# Patient Record
Sex: FEMALE | Race: WHITE | HISPANIC OR LATINO | URBAN - METROPOLITAN AREA
[De-identification: names, ages, dates, MRNs, and addresses within clinical notes are randomized per-mention and may not be internally consistent; named-entity substitution may affect disease eponyms.]

---

## 2021-03-01 ENCOUNTER — OUTPATIENT (OUTPATIENT)
Dept: OUTPATIENT SERVICES | Facility: HOSPITAL | Age: 86
LOS: 1 days | End: 2021-03-01
Payer: MEDICAID

## 2021-03-01 PROCEDURE — G9005: CPT

## 2021-03-02 ENCOUNTER — INPATIENT (INPATIENT)
Facility: HOSPITAL | Age: 86
LOS: 3 days | Discharge: HOME | DRG: 552 | End: 2021-03-06
Attending: NEUROLOGICAL SURGERY | Admitting: NEUROLOGICAL SURGERY
Payer: MEDICAID

## 2021-03-02 VITALS
TEMPERATURE: 100 F | HEART RATE: 102 BPM | DIASTOLIC BLOOD PRESSURE: 73 MMHG | OXYGEN SATURATION: 100 % | WEIGHT: 115.08 LBS | RESPIRATION RATE: 16 BRPM | SYSTOLIC BLOOD PRESSURE: 167 MMHG | HEIGHT: 58 IN

## 2021-03-02 LAB
ALBUMIN SERPL ELPH-MCNC: 3.5 G/DL — SIGNIFICANT CHANGE UP (ref 3.3–5)
ALP SERPL-CCNC: 227 U/L — HIGH (ref 40–120)
ALT FLD-CCNC: 20 U/L — SIGNIFICANT CHANGE UP (ref 10–45)
ANION GAP SERPL CALC-SCNC: 12 MMOL/L — SIGNIFICANT CHANGE UP (ref 5–17)
AST SERPL-CCNC: 44 U/L — HIGH (ref 10–40)
BASOPHILS # BLD AUTO: 0.05 K/UL — SIGNIFICANT CHANGE UP (ref 0–0.2)
BASOPHILS NFR BLD AUTO: 0.5 % — SIGNIFICANT CHANGE UP (ref 0–2)
BILIRUB SERPL-MCNC: 1.4 MG/DL — HIGH (ref 0.2–1.2)
BUN SERPL-MCNC: 16 MG/DL — SIGNIFICANT CHANGE UP (ref 7–23)
CALCIUM SERPL-MCNC: 9.1 MG/DL — SIGNIFICANT CHANGE UP (ref 8.4–10.5)
CHLORIDE SERPL-SCNC: 105 MMOL/L — SIGNIFICANT CHANGE UP (ref 96–108)
CO2 SERPL-SCNC: 19 MMOL/L — LOW (ref 22–31)
CREAT SERPL-MCNC: 0.66 MG/DL — SIGNIFICANT CHANGE UP (ref 0.5–1.3)
EOSINOPHIL # BLD AUTO: 0.06 K/UL — SIGNIFICANT CHANGE UP (ref 0–0.5)
EOSINOPHIL NFR BLD AUTO: 0.6 % — SIGNIFICANT CHANGE UP (ref 0–6)
GAS PNL BLDV: SIGNIFICANT CHANGE UP
GLUCOSE SERPL-MCNC: 168 MG/DL — HIGH (ref 70–99)
HCT VFR BLD CALC: 40.2 % — SIGNIFICANT CHANGE UP (ref 34.5–45)
HGB BLD-MCNC: 13.5 G/DL — SIGNIFICANT CHANGE UP (ref 11.5–15.5)
IMM GRANULOCYTES NFR BLD AUTO: 0.2 % — SIGNIFICANT CHANGE UP (ref 0–1.5)
LYMPHOCYTES # BLD AUTO: 1.95 K/UL — SIGNIFICANT CHANGE UP (ref 1–3.3)
LYMPHOCYTES # BLD AUTO: 20.7 % — SIGNIFICANT CHANGE UP (ref 13–44)
MCHC RBC-ENTMCNC: 30.1 PG — SIGNIFICANT CHANGE UP (ref 27–34)
MCHC RBC-ENTMCNC: 33.6 GM/DL — SIGNIFICANT CHANGE UP (ref 32–36)
MCV RBC AUTO: 89.5 FL — SIGNIFICANT CHANGE UP (ref 80–100)
MONOCYTES # BLD AUTO: 0.97 K/UL — HIGH (ref 0–0.9)
MONOCYTES NFR BLD AUTO: 10.3 % — SIGNIFICANT CHANGE UP (ref 2–14)
NEUTROPHILS # BLD AUTO: 6.35 K/UL — SIGNIFICANT CHANGE UP (ref 1.8–7.4)
NEUTROPHILS NFR BLD AUTO: 67.7 % — SIGNIFICANT CHANGE UP (ref 43–77)
NRBC # BLD: 0 /100 WBCS — SIGNIFICANT CHANGE UP (ref 0–0)
PLATELET # BLD AUTO: 209 K/UL — SIGNIFICANT CHANGE UP (ref 150–400)
POTASSIUM SERPL-MCNC: 4.4 MMOL/L — SIGNIFICANT CHANGE UP (ref 3.5–5.3)
POTASSIUM SERPL-SCNC: 4.4 MMOL/L — SIGNIFICANT CHANGE UP (ref 3.5–5.3)
PROT SERPL-MCNC: 6.7 G/DL — SIGNIFICANT CHANGE UP (ref 6–8.3)
RBC # BLD: 4.49 M/UL — SIGNIFICANT CHANGE UP (ref 3.8–5.2)
RBC # FLD: 15.4 % — HIGH (ref 10.3–14.5)
SODIUM SERPL-SCNC: 136 MMOL/L — SIGNIFICANT CHANGE UP (ref 135–145)
WBC # BLD: 9.4 K/UL — SIGNIFICANT CHANGE UP (ref 3.8–10.5)
WBC # FLD AUTO: 9.4 K/UL — SIGNIFICANT CHANGE UP (ref 3.8–10.5)

## 2021-03-02 PROCEDURE — 71045 X-RAY EXAM CHEST 1 VIEW: CPT | Mod: 26

## 2021-03-02 PROCEDURE — 99285 EMERGENCY DEPT VISIT HI MDM: CPT

## 2021-03-02 PROCEDURE — G1004: CPT

## 2021-03-02 PROCEDURE — 93010 ELECTROCARDIOGRAM REPORT: CPT

## 2021-03-02 PROCEDURE — 73030 X-RAY EXAM OF SHOULDER: CPT | Mod: 26,RT

## 2021-03-02 PROCEDURE — 73562 X-RAY EXAM OF KNEE 3: CPT | Mod: 26,LT

## 2021-03-02 PROCEDURE — 70450 CT HEAD/BRAIN W/O DYE: CPT | Mod: 26,MA

## 2021-03-02 PROCEDURE — 72125 CT NECK SPINE W/O DYE: CPT | Mod: 26,MG

## 2021-03-02 RX ORDER — ACETAMINOPHEN 500 MG
650 TABLET ORAL ONCE
Refills: 0 | Status: COMPLETED | OUTPATIENT
Start: 2021-03-02 | End: 2021-03-02

## 2021-03-02 RX ADMIN — Medication 650 MILLIGRAM(S): at 23:42

## 2021-03-02 NOTE — ED ADULT NURSE NOTE - CADM POA CENTRAL LINE
[facility]    PATIENT'S NAME: VALERIANO OLIVIER   ATTENDING PHYSICIAN: Romy Burks M.D.   Anjelica Mustache: Scott Jeans, M.D.    PATIENT ACCOUNT#:   [de-identified]    LOCATION:  [address]  MEDICAL RECORD #:   [id number]       DATE OF BIRTH: assessments:     1. Abnormal EKG. There are subtle EKG changes seen. These may represent J-point elevation. Interpretation by the computer system is perhaps acute ischemia, but the patient is asymptomatic with a negative biomarker assay.   At this poin No

## 2021-03-02 NOTE — ED PROVIDER NOTE - ATTENDING CONTRIBUTION TO CARE
89 F Armenian speaking hx provided by granddaughter, hx of RA and HTN, HLD who states pt had an unwitnessed fall, from standing pt hit her head, not on blood thinners, Pt is in the ER due to head pain and L knee pain, she reports that she has been ambulating since the fall, she told grandaueliazarter that she landed on her side, and hit her front of her body. Pt was assisted to her feet by her gradundater.. pt w/ chronic R shoulder pain, is originally from [facility]. Was told that she has a goiter in the neck that was evaluated in the past, She has no fevers, no chills, no nausea no vomiting, She has no lightheadedness no dizziness, she is at her baseline mental status she is in good spirits, she had a fall earlier this year and injured her R shoulder, she has chronic rotator cuff injury to the R shoulder, she reports that she has no new pain to the shoulder, she has 2+ radial pulse, she has intact flexion and extension of the wrist and hand bilaterally, she has no posterior scapula tenderness bilaterally, no spine of the scapula tenderness, no t/l spine tenderness. she is w/ 5/5 lower extremity strength, she has no facial asymmetry. abdomen soft, nt nd, intact flexion and extension of the bilateral knees swelling around the L knee. lungs clear. Will have labs, ct and reassessment. will send urinalysis to evaluate for infection and covid swab given pts fever,

## 2021-03-02 NOTE — ED ADULT NURSE NOTE - OBJECTIVE STATEMENT
89y F arrived to the ED s/p fall. Pt Slovenian speaking, granddaughter reports pt fell around 3pm unwitnessed and called out for help upon fall where she was found by family member. Pt reports she tripped and fell. Pt now endorsing L knee pain, R forehead pain, dizziness. Bruising noted near R eye. L knee swelling noted. Pt A&Ox3 at present. Pt denies chest pain, SOB, HA, N/V/D, abdominal pain, fever/chills, urinary symptoms, hematuria. Pt placed in position of comfort. Pt educated on call bell system and provided call bell. Bed in lowest position, wheels locked, appropriate side rails raised. Pt denies needs at this time.

## 2021-03-02 NOTE — ED PROVIDER NOTE - OBJECTIVE STATEMENT
89y Maori speaking F pmhx HTN, HLD, RA, presents to ED, accompanied by granddaughter providing translation, c/o head contusion s/p unwitnessed mechanical fall at home today at 3PM. Denies LOC, AC use, n/v. Pt lives with family and ambulates independently at home. Pt reports she lost her footing while reaching for a door, falling forward on her knees and hitting the right side of her forehead on the ground. Pt was able to call for granddaughter who assisted pt to feet. Denies preceding symptoms prior to fall. Pt now c/o head bruise, L knee pain, R shoulder pain (chronic as per granddaughter). As per granddaughter at baseline mentation. Of note pt 100F in triage. Pt previously COVID+, resolved.

## 2021-03-02 NOTE — ED PROVIDER NOTE - PHYSICAL EXAMINATION
GEN: Pt elderly, non-toxic in NAD, A&Ox3.  PSYCH: Affect and mood appropriate.  EYES: Sclera white w/o injection, EOMI, PERRLA.   ENT: Neck supple FROM. Airway patent.  RESP: No chest wall tenderness, CTA b/l, no wheezes, rales, or rhonchi.   CARDIAC: RRR, clear distinct S1, S2.  ABD: Abdomen soft, non-tender. No CVAT b/l.  MSK: +L knee contusion, FROM. Baseline ROM of b/l UE. No obvious spinal deformity, no midline spinal ttp, no step-offs.  NEURO: No focal motor or sensory deficits.  VASC: Radial and dorsalis pedis pulses 2+ b/l. No edema or calf tenderness.  SKIN: No rashes or lesions. GEN: Pt elderly, non-toxic in NAD, A&Ox3.  PSYCH: Affect and mood appropriate.  EYES: Sclera white w/o injection, EOMI, PERRLA.   ENT: Neck supple FROM. Airway patent. No nasal tenderness.  RESP: No chest wall tenderness, CTA b/l, no wheezes, rales, or rhonchi.   CARDIAC: RRR, clear distinct S1, S2.  ABD: Abdomen soft, non-tender. No CVAT b/l.  MSK: +L knee contusion, FROM. Baseline ROM of b/l UE. No obvious spinal deformity, no midline spinal ttp, no step-offs. No pelvic instability.  NEURO: No focal motor or sensory deficits.  VASC: Radial and dorsalis pedis pulses 2+ b/l. No edema or calf tenderness.  SKIN: +R forehead contusion to R brow extending to nasal bridge. No rashes or lesions.

## 2021-03-02 NOTE — ED ADULT NURSE NOTE - NSIMPLEMENTINTERV_GEN_ALL_ED
Implemented All Fall with Harm Risk Interventions:  [place] to call system. Call bell, personal items and telephone within reach. Instruct patient to call for assistance. Room bathroom lighting operational. Non-slip footwear when patient is off stretcher. Physically safe environment: no spills, clutter or unnecessary equipment. Stretcher in lowest position, wheels locked, appropriate side rails in place. Provide visual cue, wrist band, yellow gown, etc. Monitor gait and stability. Monitor for mental status changes and reorient to person, place, and time. Review medications for side effects contributing to fall risk. Reinforce activity limits and safety measures with patient and family. Provide visual clues: red socks.

## 2021-03-02 NOTE — ED ADULT TRIAGE NOTE - CHIEF COMPLAINT QUOTE
Pt fell today and has swelling to right side of forehead, Pt is 100F orally in triage. Pt not on blood thinners.

## 2021-03-02 NOTE — ED PROVIDER NOTE - PROGRESS NOTE DETAILS
spoke w/ radiology pt w/ dens fracture, pt has a c collar, discussion w/ granddaughter about grandmothers goals of care, states that likely her grandmother would likely not want sx and would not want to be hospitalized if possible she states that her grandmother doesn't even want to be wearing a c collar, she has a shoulder sling she is supposed to wear at home but doesn't likely walking at home around with it. seen by nsg recommending MRI, given pts baseline ambulatory status w/ family member at her side, unable to go to cdu will admit to nsg home meds per granddaughter- omeprazole 40, besylate 5

## 2021-03-03 DIAGNOSIS — S12.100A UNSPECIFIED DISPLACED FRACTURE OF SECOND CERVICAL VERTEBRA, INITIAL ENCOUNTER FOR CLOSED FRACTURE: ICD-10-CM

## 2021-03-03 DIAGNOSIS — Z29.9 ENCOUNTER FOR PROPHYLACTIC MEASURES, UNSPECIFIED: ICD-10-CM

## 2021-03-03 DIAGNOSIS — R74.01 ELEVATION OF LEVELS OF LIVER TRANSAMINASE LEVELS: ICD-10-CM

## 2021-03-03 DIAGNOSIS — I10 ESSENTIAL (PRIMARY) HYPERTENSION: ICD-10-CM

## 2021-03-03 DIAGNOSIS — M06.9 RHEUMATOID ARTHRITIS, UNSPECIFIED: ICD-10-CM

## 2021-03-03 DIAGNOSIS — S42.109A FRACTURE OF UNSPECIFIED PART OF SCAPULA, UNSPECIFIED SHOULDER, INITIAL ENCOUNTER FOR CLOSED FRACTURE: ICD-10-CM

## 2021-03-03 LAB
APPEARANCE UR: CLEAR — SIGNIFICANT CHANGE UP
BACTERIA # UR AUTO: NEGATIVE — SIGNIFICANT CHANGE UP
BILIRUB UR-MCNC: NEGATIVE — SIGNIFICANT CHANGE UP
COLOR SPEC: YELLOW — SIGNIFICANT CHANGE UP
CULTURE RESULTS: SIGNIFICANT CHANGE UP
DIFF PNL FLD: NEGATIVE — SIGNIFICANT CHANGE UP
EPI CELLS # UR: 2 /HPF — SIGNIFICANT CHANGE UP
GLUCOSE UR QL: NEGATIVE — SIGNIFICANT CHANGE UP
HYALINE CASTS # UR AUTO: 7 /LPF — HIGH (ref 0–2)
KETONES UR-MCNC: NEGATIVE — SIGNIFICANT CHANGE UP
LEUKOCYTE ESTERASE UR-ACNC: ABNORMAL
NITRITE UR-MCNC: NEGATIVE — SIGNIFICANT CHANGE UP
PH UR: 5.5 — SIGNIFICANT CHANGE UP (ref 5–8)
PROT UR-MCNC: NEGATIVE — SIGNIFICANT CHANGE UP
RBC CASTS # UR COMP ASSIST: 3 /HPF — SIGNIFICANT CHANGE UP (ref 0–4)
SARS-COV-2 IGG SERPL QL IA: POSITIVE
SARS-COV-2 IGM SERPL IA-ACNC: 97.7 INDEX — HIGH
SARS-COV-2 RNA SPEC QL NAA+PROBE: SIGNIFICANT CHANGE UP
SP GR SPEC: 1.02 — SIGNIFICANT CHANGE UP (ref 1.01–1.02)
SPECIMEN SOURCE: SIGNIFICANT CHANGE UP
UROBILINOGEN FLD QL: NEGATIVE — SIGNIFICANT CHANGE UP
WBC UR QL: 4 /HPF — SIGNIFICANT CHANGE UP (ref 0–5)

## 2021-03-03 PROCEDURE — 72141 MRI NECK SPINE W/O DYE: CPT | Mod: 26

## 2021-03-03 PROCEDURE — 73200 CT UPPER EXTREMITY W/O DYE: CPT | Mod: 26,RT

## 2021-03-03 PROCEDURE — 99233 SBSQ HOSP IP/OBS HIGH 50: CPT

## 2021-03-03 PROCEDURE — 76377 3D RENDER W/INTRP POSTPROCES: CPT | Mod: 26

## 2021-03-03 PROCEDURE — 93970 EXTREMITY STUDY: CPT | Mod: 26

## 2021-03-03 PROCEDURE — 99223 1ST HOSP IP/OBS HIGH 75: CPT

## 2021-03-03 PROCEDURE — 99221 1ST HOSP IP/OBS SF/LOW 40: CPT

## 2021-03-03 RX ORDER — AMLODIPINE BESYLATE 2.5 MG/1
1 TABLET ORAL
Qty: 0 | Refills: 0 | DISCHARGE

## 2021-03-03 RX ORDER — POLYETHYLENE GLYCOL 3350 17 G/17G
17 POWDER, FOR SOLUTION ORAL
Refills: 0 | Status: DISCONTINUED | OUTPATIENT
Start: 2021-03-03 | End: 2021-03-06

## 2021-03-03 RX ORDER — ACETAMINOPHEN 500 MG
650 TABLET ORAL EVERY 6 HOURS
Refills: 0 | Status: DISCONTINUED | OUTPATIENT
Start: 2021-03-03 | End: 2021-03-06

## 2021-03-03 RX ORDER — SENNA PLUS 8.6 MG/1
2 TABLET ORAL AT BEDTIME
Refills: 0 | Status: DISCONTINUED | OUTPATIENT
Start: 2021-03-03 | End: 2021-03-06

## 2021-03-03 RX ORDER — PANTOPRAZOLE SODIUM 20 MG/1
40 TABLET, DELAYED RELEASE ORAL
Refills: 0 | Status: DISCONTINUED | OUTPATIENT
Start: 2021-03-03 | End: 2021-03-06

## 2021-03-03 RX ORDER — OMEPRAZOLE 10 MG/1
1 CAPSULE, DELAYED RELEASE ORAL
Qty: 0 | Refills: 0 | DISCHARGE

## 2021-03-03 RX ORDER — ENOXAPARIN SODIUM 100 MG/ML
40 INJECTION SUBCUTANEOUS
Refills: 0 | Status: DISCONTINUED | OUTPATIENT
Start: 2021-03-03 | End: 2021-03-06

## 2021-03-03 RX ORDER — AMLODIPINE BESYLATE 2.5 MG/1
5 TABLET ORAL DAILY
Refills: 0 | Status: DISCONTINUED | OUTPATIENT
Start: 2021-03-03 | End: 2021-03-06

## 2021-03-03 RX ADMIN — ENOXAPARIN SODIUM 40 MILLIGRAM(S): 100 INJECTION SUBCUTANEOUS at 18:30

## 2021-03-03 RX ADMIN — POLYETHYLENE GLYCOL 3350 17 GRAM(S): 17 POWDER, FOR SOLUTION ORAL at 18:30

## 2021-03-03 RX ADMIN — AMLODIPINE BESYLATE 5 MILLIGRAM(S): 2.5 TABLET ORAL at 05:39

## 2021-03-03 RX ADMIN — PANTOPRAZOLE SODIUM 40 MILLIGRAM(S): 20 TABLET, DELAYED RELEASE ORAL at 05:39

## 2021-03-03 NOTE — PHYSICAL THERAPY INITIAL EVALUATION ADULT - ADDITIONAL COMMENTS
IMPRESSION 3/2/21: Head CT: Small right frontal scalp hematoma. No acute intracranial hemorrhage, vasogenic edema, extra-axial calvarial fracture. Cervical spine CT: Type II dens fracture. Cervical spondylosis as above.  X-Ray R Shoulder 3/2/21 IMPRESSION: Cortical step-off along the inferior medial border of the scapular body consistent with scapular fracture.  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  B LE Vein Scan 3/3/21 IMPRESSION: No evidence of deep venous thrombosis in either lower extremity.

## 2021-03-03 NOTE — CONSULT NOTE ADULT - PROBLEM SELECTOR RECOMMENDATION 3
mildly elevated bilirubin, alk phos and AST  would check repeat CMP in AM  no GI symptoms at this time

## 2021-03-03 NOTE — PHYSICAL THERAPY INITIAL EVALUATION ADULT - LIVES WITH, PROFILE
Pt resides in apartment with daughter, VALDEMAR, & granddaughter with 14 steps to enter and handrail assist. Pt reports being functionally independent PTA, performing ADLs without assist, ambulating without an AD. Pt owns straight cane however, reports holding onto walls and furniture during functional ambulation for balance./children

## 2021-03-03 NOTE — H&P ADULT - ASSESSMENT
Emily Goel  89F PMHx RA, COVID (resolved), presents to ED after unwitnessed fall at home, denies LOC. Normally independent at home. CT head grossly wnl, CT C-spine shows type 2B dens Fx. Exam: Danish speaking, in collar, neck pain, otherwise intact.  - Can't go CDU, adm nsgy floor MR C-spine  - May benefit from odontoid screw if medically able  - Continue collar all times  - Plan pending MR C w/o

## 2021-03-03 NOTE — H&P ADULT - ATTENDING COMMENTS
CT C-spine reviewed, demonstrates type II odontoid fx.    Pt currently in MRI.    Will need to discuss treatment options with patient and family.

## 2021-03-03 NOTE — CONSULT NOTE ADULT - PROBLEM/RECOMMENDATION-4
Patient ambulated to restroom by Searcy Angelucci, RN. Patient awaiting ride in wheelchair in ED 7. This RN spoke to grand daughter [facility] who is en route to pick patient up and take her back to assisted living facility. Patient appears in no apparent distress. DISPLAY PLAN FREE TEXT

## 2021-03-03 NOTE — CONSULT NOTE ADULT - ASSESSMENT
89F PMHx RA(not on chronic med), COVID in 3/2020 (no hospitalization required), HTN, ? HLD presented to ED after an unwitnessed fall at home, found to have type 2B dens Fx and right scapular fx.    granddaughter (Serena Daniel [phone]) 89F PMHx RA(not on chronic med), COVID in 3/2020 (no hospitalization required), HTN, ? HLD presented to ED after an unwitnessed fall at home, found to have type 2B dens fracture and right scapular fx.    granddaughter (Serena Daniel [phone])

## 2021-03-03 NOTE — CONSULT NOTE ADULT - PROBLEM SELECTOR RECOMMENDATION 4
seen by ortho  NWB josé luis ESTRADA in sling  f/u CT right shoulder  no acute orthopedic intervention

## 2021-03-03 NOTE — CONSULT NOTE ADULT - PROBLEM SELECTOR RECOMMENDATION 9
plan for MRI and possible OR tomorrow per neurosurgery  RCRI score 0 (class I risk)  check preop coags  at this time no absolute medical contraindication for planned procedure

## 2021-03-03 NOTE — CONSULT NOTE ADULT - ASSESSMENT
Assessment/Plan:  89y Female with R scapula fracture    -Pain control as needed  -DVT ppx per primary team  -NWKEVIN RULYNNE in sling   -FU CT R shoulder  -No orthopedic surgical intervention needed at this time  -Cervical spine fracture care per neurosurgery   -Will discuss with attending, and advise if plan changes Assessment/Plan:  89y Female with moderate to severe OA of the R shoulder     -No scapula fracture seen on xray  -Pain control as needed  -DVT ppx per primary team  -WBAT RUE, sling for comfort   -PT/OT  -No orthopedic surgical intervention needed at this time  -Cervical spine fracture care per neurosurgery   -Will discuss with attending, and advise if plan changes Assessment/Plan:  89y Female with moderate to severe OA of the R shoulder     -No scapula fracture seen on CT  -Pain control as needed  -DVT ppx per primary team  -WBAT RUE, sling for comfort   -PT/OT  -No orthopedic surgical intervention needed at this time  -Cervical spine fracture care per neurosurgery   -Will discuss with attending, and advise if plan changes

## 2021-03-03 NOTE — CHART NOTE - NSCHARTNOTEFT_GEN_A_CORE
CAPRINI SCORE [CLOT] Score on Admission for     AGE RELATED RISK FACTORS                                                       MOBILITY RELATED FACTORS  [ ] Age 41-60 years                                            (1 Point)                  [ ] Bed rest                                                        (1 Point)  [ ] Age: 61-74 years                                           (2 Points)                 [ ] Plaster cast                                                   (2 Points)  [ x] Age= 75 years                                              (3 Points)                 [ ] Bed bound for more than 72 hours                 (2 Points)    DISEASE RELATED RISK FACTORS                                               GENDER SPECIFIC FACTORS  [ ] Edema in the lower extremities                       (1 Point)                  [ ] Pregnancy                                                     (1 Point)  [ ] Varicose veins                                               (1 Point)                  [ ] Post-partum < 6 weeks                                   (1 Point)             [ ] BMI > 25 Kg/m2                                            (1 Point)                  [ ] Hormonal therapy  or oral contraception          (1 Point)                 [ ] Sepsis (in the previous month)                        (1 Point)                  [ ] History of pregnancy complications                 (1 point)  [ ] Pneumonia or serious lung disease                                               [ ] Unexplained or recurrent                     (1 Point)           (in the previous month)                               (1 Point)  [ ] Abnormal pulmonary function test                     (1 Point)                 SURGERY RELATED RISK FACTORS (include planned surgeries)  [ ] Acute myocardial infarction                              (1 Point)                 [ ]  Section                                             (1 Point)  [ ] Congestive heart failure (in the previous month)  (1 Point)         [ ] Minor surgery                                                  (1 Point)   [ ] Inflammatory bowel disease                             (1 Point)                 [ ] Arthroscopic surgery                                        (2 Points)  [ ] Central venous access                                      (2 Points)                [ x] General surgery lasting more than 45 minutes   (2 Points)       [ ] Stroke (in the previous month)                          (5 Points)               [ ] Elective arthroplasty                                         (5 Points)            [ ] current or past malignancy                              (2 Points)                                                                                                       HEMATOLOGY RELATED FACTORS                                                 TRAUMA RELATED RISK FACTORS  [ ] Prior episodes of VTE                                     (3 Points)                [ ] Fracture of the hip, pelvis, or leg                       (5 Points)  [ ] Positive family history for VTE                         (3 Points)                 [ ] Acute spinal cord injury (in the previous month)  (5 Points)  [ ] Prothrombin 72288 A                                     (3 Points)                 [ ] Paralysis  (less than 1 month)                             (5 Points)  [ ] Factor V Leiden                                             (3 Points)                  [ ] Multiple Trauma within 1 month                        (5 Points)  [ ] Lupus anticoagulants                                     (3 Points)                                                           [ ] Anticardiolipin antibodies                               (3 Points)                                                       [ ] High homocysteine in the blood                      (3 Points)                                             [ ] Other congenital or acquired thrombophilia      (3 Points)                                                [ ] Heparin induced thrombocytopenia                  (3 Points)                                          Total Score [     5     ]    Risk:  Very low 0   Low 1 to 2   Moderate 3 to 4   High =5       VTE Prophylasix Recommednations:  [x ] mechanical pneumatic compression devices                                      [ ] contraindicated: _____________________  [ ] chemo prophylasix                                                                                   [x ] contraindicated bleeding risk     **** HIGH LIKELIHOOD DVT PRESENT ON ADMISSION  [ ] (please order LE dopplers within 24 hours of admission)

## 2021-03-03 NOTE — PROGRESS NOTE ADULT - SUBJECTIVE AND OBJECTIVE BOX
SUBJECTIVE: Patient seen and examined using  [facility] (ID [id number]). Complains of neck pain. + collar in place     OVERNIGHT EVENTS: none     Vital Signs Last 24 Hrs  T(C): 36.4 (03 Mar 2021 09:17), Max: 38 (02 Mar 2021 22:44)  T(F): 97.5 (03 Mar 2021 09:17), Max: 100.4 (02 Mar 2021 22:44)  HR: 105 (03 Mar 2021 09:17) (90 - 105)  BP: 158/69 (03 Mar 2021 09:17) (134/55 - 167/73)  BP(mean): --  RR: 18 (03 Mar 2021 09:17) (13 - 18)  SpO2: 95% (03 Mar 2021 09:17) (94% - 100%)    PHYSICAL EXAM:    General: No Acute Distress     Neurological: Awake, alert oriented to person, place and time, Following Commands, PERRL, EOMI, Face Symmetrical, Speech Fluent, Moving all extremities, Muscle Strength normal in all four extremities, No Drift, Sensation to Light Touch Intact    Pulmonary: Clear to Auscultation, No Rales, No Rhonchi, No Wheezes     Cardiovascular: S1, S2, Regular Rate and Rhythm     Gastrointestinal: Soft, Nontender, Nondistended     LABS:                        13.5   9.40  )-----------( 209      ( 02 Mar 2021 22:59 )             40.2    03-02    136  |  105  |  16  ----------------------------<  168<H>  4.4   |  19<L>  |  0.66    Ca    9.1      02 Mar 2021 22:59    TPro  6.7  /  Alb  3.5  /  TBili  1.4<H>  /  DBili  x   /  AST  44<H>  /  ALT  20  /  AlkPhos  227<H>  03-02 03-02 @ 07:01  -  03-03 @ 07:00  --------------------------------------------------------  IN: 50 mL / OUT: 0 mL / NET: 50 mL    MEDICATIONS  (STANDING):  amLODIPine   Tablet 5 milliGRAM(s) Oral daily  enoxaparin Injectable 40 milliGRAM(s) SubCutaneous <User Schedule>  pantoprazole    Tablet 40 milliGRAM(s) Oral before breakfast    MEDICATIONS  (PRN):  acetaminophen   Tablet .. 650 milliGRAM(s) Oral every 6 hours PRN Temp greater or equal to 38C (100.4F), Mild Pain (1 - 3)  bisacodyl 5 milliGRAM(s) Oral daily PRN Constipation  senna 2 Tablet(s) Oral at bedtime PRN Constipation      DIET: regular diet      IMAGING: < from: CT Head No Cont (03.02.21 @ 23:27) >  IMPRESSION:  Head CT: Small right frontal scalp hematoma. No acute intracranial hemorrhage, vasogenic edema, extra-axial calvarial fracture.    Cervical spine CT: Type II dens fracture. Cervical spondylosis as above.    < end of copied text >    < from: Xray Shoulder 2 Views, Right (03.02.21 @ 23:15) >  IMPRESSION:  Cortical step-off along the inferior medial border of the scapular body consistent with scapular fracture.    < end of copied text >    < from: Xray Chest 1 View- PORTABLE-Urgent (Xray Chest 1 View- PORTABLE-Urgent .) (03.02.21 @ 23:15) >  IMPRESSION: Clear lungs.    < end of copied text >

## 2021-03-03 NOTE — CONSULT NOTE ADULT - SUBJECTIVE AND OBJECTIVE BOX
[facility] Division of Hospital Medicine  Nahomy Cristina MD    Spectra: [phone]    History obtained from the patient via specific  and granddaughter (Serena Daniel [phone])    HPI:  89F PMHx RA(not on chronic med), COVID in 3/2020 (no hospitalization required), HTN, ? HLD presented to ED after an unwitnessed fall at home. Patient reports she was trying to open the door but slipped and fell. No LOC. CT C-spine showed type 2B dens Fx. Patient lives with her oldest daughter and is relatively independent. She is able to feed and dress herself, walks without any assistive device mostly in her home. Patient denies any neck or chest or right shoulder pain. Had left shoulder pain transiently but improved. Denies SOB, difficulty with urination or bowel movement, any other bodily pain.     PMD: Dr. Tom Gibson  Rheum: Dr. Henson?    PAST MEDICAL & SURGICAL HISTORY:  HTN  RA  ?HLD  COVID in 3/2020  Breast surgery(denies cancer history)  hernia repair  bladder surgery  appendectomy    Review of Systems:   CONSTITUTIONAL: No fever, chills  HEENT: No sore throat, vision changes  RESPIRATORY: No cough, wheezing, shortness of breath  CARDIOVASCULAR: No chest pain, palpitations, leg edema  GASTROINTESTINAL: No abdominal pain, nausea, vomiting, diarrhea or constipation. No melena or hematochezia.  GENITOURINARY: No dysuria, frequency, hematuria  NEUROLOGICAL: No headaches, + short term memory loss, loss of strength, numbness, or tremors  SKIN: No itching, burning, rashes, or lesions   MUSCULOSKELETAL: No neck or shoulder pain  PSYCHIATRIC: No depression, anxiety  HEME/LYMPH: No easy bruising, or bleeding gums      Allergies    penicillin (Unknown)    Intolerances        Social History:   lives with oldest daughter  no tobacco or alcohol use    FAMILY HISTORY:  no family hx of RA    HOME MEDS:  amlodipine 5mg  omeprazole 40mg  vit D    MEDICATIONS  (STANDING):  amLODIPine   Tablet 5 milliGRAM(s) Oral daily  enoxaparin Injectable 40 milliGRAM(s) SubCutaneous <User Schedule>  pantoprazole    Tablet 40 milliGRAM(s) Oral before breakfast  polyethylene glycol 3350 17 Gram(s) Oral two times a day    MEDICATIONS  (PRN):  acetaminophen   Tablet .. 650 milliGRAM(s) Oral every 6 hours PRN Temp greater or equal to 38C (100.4F), Mild Pain (1 - 3)  bisacodyl 5 milliGRAM(s) Oral daily PRN Constipation  senna 2 Tablet(s) Oral at bedtime PRN Constipation        CAPILLARY BLOOD GLUCOSE        I&O's Summary    02 Mar 2021 07:01  -  03 Mar 2021 07:00  --------------------------------------------------------  IN: 50 mL / OUT: 0 mL / NET: 50 mL    03 Mar 2021 07:01  -  03 Mar 2021 15:05  --------------------------------------------------------  IN: 120 mL / OUT: 450 mL / NET: -330 mL        Physical Exam:  Vital Signs Last 24 Hrs  T(C): 36.7 (03 Mar 2021 13:47), Max: 38 (02 Mar 2021 22:44)  T(F): 98 (03 Mar 2021 13:47), Max: 100.4 (02 Mar 2021 22:44)  HR: 103 (03 Mar 2021 13:47) (90 - 105)  BP: 149/53 (03 Mar 2021 13:47) (134/55 - 167/73)  BP(mean): --  RR: 18 (03 Mar 2021 13:47) (13 - 18)  SpO2: 97% (03 Mar 2021 13:47) (94% - 100%)    CONSTITUTIONAL: NAD, well-developed, well-groomed  NECK: in c-collar  RESPIRATORY: Normal respiratory effort; lungs are clear to auscultation bilaterally  CARDIOVASCULAR: normal S1 and S2, no murmur/rub/gallop; No lower extremity edema  ABDOMEN: Nontender to palpation, normoactive bowel sounds, no rebound/guarding; No hepatosplenomegaly  MUSCULOSKELETAL: no clubbing or cyanosis of digits; no joint swelling or tenderness to palpation, moves all extremities but limited ROM of right shoulder  PSYCH: A+O to person, place, and time; affect appropriate  NEUROLOGY: CN 2-12 are intact and symmetric; no gross sensory deficits   SKIN: No rashes; no palpable lesions, + ecchymosis in right eye    LABS:                        13.5   9.40  )-----------( 209      ( 02 Mar 2021 22:59 )             40.2         136  |  105  |  16  ----------------------------<  168<H>  4.4   |  19<L>  |  0.66    Ca    9.1      02 Mar 2021 22:59    TPro  6.7  /  Alb  3.5  /  TBili  1.4<H>  /  DBili  x   /  AST  44<H>  /  ALT  20  /  AlkPhos  227<H>            Urinalysis Basic - ( 03 Mar 2021 00:10 )    Color: Yellow / Appearance: Clear / S.022 / pH: x  Gluc: x / Ketone: Negative  / Bili: Negative / Urobili: Negative   Blood: x / Protein: Negative / Nitrite: Negative   Leuk Esterase: Large / RBC: 3 /hpf / WBC 4 /HPF   Sq Epi: x / Non Sq Epi: 2 /hpf / Bacteria: Negative          RADIOLOGY & ADDITIONAL TESTS:  Results Reviewed:     EKG personally reviewed: NSR 97bpm, TWI III    < from: CT Head No Cont (21 @ 23:27) >  IMPRESSION:  Head CT: Small right frontal scalp hematoma. No acute intracranial hemorrhage, vasogenic edema, extra-axial calvarial fracture.    Cervical spine CT: Type II dens fracture.    < end of copied text >    < from: VA Duplex Lower Ext Vein Scan, Bilat (21 @ 12:43) >  IMPRESSION:  No evidence of deep venous thrombosis in either lower extremity.    < end of copied text >    < from: Xray Chest 1 View- PORTABLE-Urgent (Xray Chest 1 View- PORTABLE-Urgent .) (21 @ 23:15) >  IMPRESSION: Clear lungs.    < end of copied text >    < from: Xray Shoulder 2 Views, Right (21 @ 23:15) >  IMPRESSION:  Cortical step-off along the inferior medial border of the scapular body consistent with scapular fracture.    < end of copied text >    Imaging Personally Reviewed:  Electrocardiogram Personally Reviewed:    COORDINATION OF CARE:  Care Discussed with Consultants/Other Providers [Y/N]: neurosurgery PA([place])  Prior or Outpatient Records Reviewed [Y/N]:

## 2021-03-03 NOTE — PHYSICAL THERAPY INITIAL EVALUATION ADULT - DISCHARGE DISPOSITION, PT EVAL
Assist/supervision from family for ALL functional mobility and ADLs as patient is a fall risk/home w/ home PT

## 2021-03-03 NOTE — CONSULT NOTE ADULT - PROBLEM SELECTOR RECOMMENDATION 5
not on chronic disease modifying meds at home and only takes pain medication as needed per granddaughter

## 2021-03-03 NOTE — PHYSICAL THERAPY INITIAL EVALUATION ADULT - PLANNED THERAPY INTERVENTIONS, PT EVAL
STAIR GOAL: Pt will negotiate 1 FOS independently with handrail assist within 3-4 wks./balance training/bed mobility training/gait training/transfer training

## 2021-03-03 NOTE — PHYSICAL THERAPY INITIAL EVALUATION ADULT - GENERAL OBSERVATIONS, REHAB EVAL
Pt received semi-supine in bed, +bed alarm, +cervical collar donned, +primafit, +IVL, VSS, Icelandic speaking  services used for session Leatha ID#[id number], agreeable to participate in therapy at this time

## 2021-03-03 NOTE — PHYSICAL THERAPY INITIAL EVALUATION ADULT - DIAGNOSIS, PT EVAL
Pt presents with mild pain, impairments in strength, balance and endurance impacting ability to perform ADLs and functional mobility

## 2021-03-03 NOTE — CONSULT NOTE ADULT - SUBJECTIVE AND OBJECTIVE BOX
89y Female presents with right scapula fracture s/p fall at home. Translation services used. Pt reports pain in the right shoulder. Denies numbness/tingling in the affected extremity. Denies pain elsewhere in the RUE.    PAST MEDICAL & SURGICAL HISTORY:    Home Medications:  Norvasc 5 mg oral tablet: 1 tab(s) orally once a day (03 Mar 2021 02:15)  omeprazole 40 mg oral delayed release capsule: 1 cap(s) orally once a day (03 Mar 2021 02:15)    Allergies    penicillin (Unknown)    Intolerances                              13.5   9.40  )-----------( 209      ( 02 Mar 2021 22:59 )             40.2         136  |  105  |  16  ----------------------------<  168<H>  4.4   |  19<L>  |  0.66    Ca    9.1      02 Mar 2021 22:59    TPro  6.7  /  Alb  3.5  /  TBili  1.4<H>  /  DBili  x   /  AST  44<H>  /  ALT  20  /  AlkPhos  227<H>        Urinalysis Basic - ( 03 Mar 2021 00:10 )    Color: Yellow / Appearance: Clear / S.022 / pH: x  Gluc: x / Ketone: Negative  / Bili: Negative / Urobili: Negative   Blood: x / Protein: Negative / Nitrite: Negative   Leuk Esterase: Large / RBC: 3 /hpf / WBC 4 /HPF   Sq Epi: x / Non Sq Epi: 2 /hpf / Bacteria: Negative          Vital Signs Last 24 Hrs  T(C): 36.7 (03 Mar 2021 13:47), Max: 38 (02 Mar 2021 22:44)  T(F): 98 (03 Mar 2021 13:47), Max: 100.4 (02 Mar 2021 22:44)  HR: 103 (03 Mar 2021 13:47) (90 - 105)  BP: 149/53 (03 Mar 2021 13:47) (134/55 - 167/73)  BP(mean): --  RR: 18 (03 Mar 2021 13:47) (13 - 18)  SpO2: 97% (03 Mar 2021 13:47) (94% - 100%)    PHYSICAL EXAM  General: NAD, Awake and Alert    RIGHT Upper Extremity:   Skin intact, no erythema, ecchymosis, edema, or gross deformity  NTTP over the bony prominences of the shoulder/elbow/wrist/hand/fingers  Limited ROM of right shoulder, pain unable to tell how long she has had range of motion deficits   Limited forward flexion to 70 degrees  Limited abduction to 80 degrees  Mild pain with passive ROM of the shoulder   Active shoulder ROM limited   Painless passive/active ROM of the elbow/wrist/hand/fingers  C5-T1 SILT  Motor grossly intact throughout axillary/musculocutaneous/radial/median/ulnar/AIN/PIN nerves  + Radial pulses  Compartments soft and compressible        IMAGING:  XR R Shoulder: Scapula fracture  CT R Shoulder: Ordered       89y Female presents with right scapula fracture s/p fall at home. Translation services used. Pt reports pain in the right shoulder. Denies numbness/tingling in the affected extremity. Denies pain elsewhere in the RUE.    PAST MEDICAL & SURGICAL HISTORY:    Home Medications:  Norvasc 5 mg oral tablet: 1 tab(s) orally once a day (03 Mar 2021 02:15)  omeprazole 40 mg oral delayed release capsule: 1 cap(s) orally once a day (03 Mar 2021 02:15)    Allergies    penicillin (Unknown)    Intolerances                              13.5   9.40  )-----------( 209      ( 02 Mar 2021 22:59 )             40.2         136  |  105  |  16  ----------------------------<  168<H>  4.4   |  19<L>  |  0.66    Ca    9.1      02 Mar 2021 22:59    TPro  6.7  /  Alb  3.5  /  TBili  1.4<H>  /  DBili  x   /  AST  44<H>  /  ALT  20  /  AlkPhos  227<H>        Urinalysis Basic - ( 03 Mar 2021 00:10 )    Color: Yellow / Appearance: Clear / S.022 / pH: x  Gluc: x / Ketone: Negative  / Bili: Negative / Urobili: Negative   Blood: x / Protein: Negative / Nitrite: Negative   Leuk Esterase: Large / RBC: 3 /hpf / WBC 4 /HPF   Sq Epi: x / Non Sq Epi: 2 /hpf / Bacteria: Negative          Vital Signs Last 24 Hrs  T(C): 36.7 (03 Mar 2021 13:47), Max: 38 (02 Mar 2021 22:44)  T(F): 98 (03 Mar 2021 13:47), Max: 100.4 (02 Mar 2021 22:44)  HR: 103 (03 Mar 2021 13:47) (90 - 105)  BP: 149/53 (03 Mar 2021 13:47) (134/55 - 167/73)  BP(mean): --  RR: 18 (03 Mar 2021 13:47) (13 - 18)  SpO2: 97% (03 Mar 2021 13:47) (94% - 100%)    PHYSICAL EXAM  General: NAD, Awake and Alert    RIGHT Upper Extremity:   Skin intact, no erythema, ecchymosis, edema, or gross deformity  NTTP over the bony prominences of the shoulder/elbow/wrist/hand/fingers  Limited ROM of right shoulder, pain unable to tell how long she has had range of motion deficits   Limited forward flexion to 75 degrees  Limited abduction to 80 degrees  Mild pain with passive ROM of the shoulder   Active shoulder ROM limited   Painless passive/active ROM of the elbow/wrist/hand/fingers  C5-T1 SILT  Motor grossly intact throughout axillary/musculocutaneous/radial/median/ulnar/AIN/PIN nerves  + Radial pulses  Compartments soft and compressible        IMAGING:  XR R Shoulder: Scapula fracture  CT R Shoulder: moderate to severe degenerative changes of the right shoulder. No fracture appreciated.       89y Female presents with right scapula fracture s/p fall at home. Translation services used. Pt reports pain in the right shoulder. Denies numbness/tingling in the affected extremity. Denies pain elsewhere in the RUE.    PAST MEDICAL & SURGICAL HISTORY:    Home Medications:  Norvasc 5 mg oral tablet: 1 tab(s) orally once a day (03 Mar 2021 02:15)  omeprazole 40 mg oral delayed release capsule: 1 cap(s) orally once a day (03 Mar 2021 02:15)    Allergies    penicillin (Unknown)    Intolerances                              13.5   9.40  )-----------( 209      ( 02 Mar 2021 22:59 )             40.2         136  |  105  |  16  ----------------------------<  168<H>  4.4   |  19<L>  |  0.66    Ca    9.1      02 Mar 2021 22:59    TPro  6.7  /  Alb  3.5  /  TBili  1.4<H>  /  DBili  x   /  AST  44<H>  /  ALT  20  /  AlkPhos  227<H>        Urinalysis Basic - ( 03 Mar 2021 00:10 )    Color: Yellow / Appearance: Clear / S.022 / pH: x  Gluc: x / Ketone: Negative  / Bili: Negative / Urobili: Negative   Blood: x / Protein: Negative / Nitrite: Negative   Leuk Esterase: Large / RBC: 3 /hpf / WBC 4 /HPF   Sq Epi: x / Non Sq Epi: 2 /hpf / Bacteria: Negative          Vital Signs Last 24 Hrs  T(C): 36.7 (03 Mar 2021 13:47), Max: 38 (02 Mar 2021 22:44)  T(F): 98 (03 Mar 2021 13:47), Max: 100.4 (02 Mar 2021 22:44)  HR: 103 (03 Mar 2021 13:47) (90 - 105)  BP: 149/53 (03 Mar 2021 13:47) (134/55 - 167/73)  BP(mean): --  RR: 18 (03 Mar 2021 13:47) (13 - 18)  SpO2: 97% (03 Mar 2021 13:47) (94% - 100%)    PHYSICAL EXAM  General: NAD, Awake and Alert    RIGHT Upper Extremity:   Skin intact, no erythema, ecchymosis, edema, or gross deformity  NTTP over the bony prominences of the shoulder/elbow/wrist/hand/fingers  Limited ROM of right shoulder, pain unable to tell how long she has had range of motion deficits   Limited forward flexion to 75 degrees  Limited abduction to 80 degrees  Mild pain with passive ROM of the shoulder   Active shoulder ROM limited   Painless passive/active ROM of the elbow/wrist/hand/fingers  C5-T1 SILT  Motor grossly intact throughout axillary/musculocutaneous/radial/median/ulnar/AIN/PIN nerves  + Radial pulses  Compartments soft and compressible        IMAGING:  XR R Shoulder: Official read reports Scapula fracture   CT R Shoulder: moderate to severe degenerative changes of the right shoulder. No fracture appreciated.

## 2021-03-03 NOTE — PHYSICAL THERAPY INITIAL EVALUATION ADULT - BALANCE TRAINING, PT EVAL
GOAL: Pt will improve static/dynamic standing balance by at least 1/2 grade to decrease fall risk during functional mobility within 3-4 wks.

## 2021-03-03 NOTE — H&P ADULT - HISTORY OF PRESENT ILLNESS
89F PMHx RA, COVID (resolved), presents to ED after unwitnessed fall at home, denies LOC. Normally independent at home. CT head grossly wnl, CT C-spine shows type 2B dens Fx.

## 2021-03-03 NOTE — PHYSICAL THERAPY INITIAL EVALUATION ADULT - REFERRING PHYSICIAN, REHAB EVAL
Myles Myles; PT Evaluate & Treat, Activity-Out of Bed with Assistance, Cervical Collar-Wear AT ALL TIMES

## 2021-03-03 NOTE — PHYSICAL THERAPY INITIAL EVALUATION ADULT - PERTINENT HX OF CURRENT PROBLEM, REHAB EVAL
89 yo F with PMHx RA, COVID (resolved), presents to [facility] ED on 3/2/21 after unwitnessed fall at home, denies LOC. CT head grossly WNL, CT C-spine shows type 2B dens Fx. Cervical collar to be worn at all times, patient awaiting urgrent MR Cervical Spine to determine plan for surgery. Pt cleared for OOB activity at this time with c-collar as per Neurosurg CHRISTIAN Carmona.

## 2021-03-04 DIAGNOSIS — E07.89 OTHER SPECIFIED DISORDERS OF THYROID: ICD-10-CM

## 2021-03-04 LAB
ALBUMIN SERPL ELPH-MCNC: 3.2 G/DL — LOW (ref 3.3–5)
ALP SERPL-CCNC: 196 U/L — HIGH (ref 40–120)
ALT FLD-CCNC: 18 U/L — SIGNIFICANT CHANGE UP (ref 10–45)
ANION GAP SERPL CALC-SCNC: 11 MMOL/L — SIGNIFICANT CHANGE UP (ref 5–17)
APTT BLD: 35.3 SEC — SIGNIFICANT CHANGE UP (ref 27.5–35.5)
AST SERPL-CCNC: 36 U/L — SIGNIFICANT CHANGE UP (ref 10–40)
BILIRUB SERPL-MCNC: 3.8 MG/DL — HIGH (ref 0.2–1.2)
BLD GP AB SCN SERPL QL: NEGATIVE — SIGNIFICANT CHANGE UP
BUN SERPL-MCNC: 17 MG/DL — SIGNIFICANT CHANGE UP (ref 7–23)
CALCIUM SERPL-MCNC: 8.9 MG/DL — SIGNIFICANT CHANGE UP (ref 8.4–10.5)
CHLORIDE SERPL-SCNC: 104 MMOL/L — SIGNIFICANT CHANGE UP (ref 96–108)
CO2 SERPL-SCNC: 20 MMOL/L — LOW (ref 22–31)
CREAT SERPL-MCNC: 0.66 MG/DL — SIGNIFICANT CHANGE UP (ref 0.5–1.3)
GLUCOSE SERPL-MCNC: 88 MG/DL — SIGNIFICANT CHANGE UP (ref 70–99)
HCT VFR BLD CALC: 37.9 % — SIGNIFICANT CHANGE UP (ref 34.5–45)
HGB BLD-MCNC: 12.5 G/DL — SIGNIFICANT CHANGE UP (ref 11.5–15.5)
INR BLD: 1.28 RATIO — HIGH (ref 0.88–1.16)
MCHC RBC-ENTMCNC: 29.4 PG — SIGNIFICANT CHANGE UP (ref 27–34)
MCHC RBC-ENTMCNC: 33 GM/DL — SIGNIFICANT CHANGE UP (ref 32–36)
MCV RBC AUTO: 89.2 FL — SIGNIFICANT CHANGE UP (ref 80–100)
NRBC # BLD: 0 /100 WBCS — SIGNIFICANT CHANGE UP (ref 0–0)
PLATELET # BLD AUTO: 217 K/UL — SIGNIFICANT CHANGE UP (ref 150–400)
POTASSIUM SERPL-MCNC: 4 MMOL/L — SIGNIFICANT CHANGE UP (ref 3.5–5.3)
POTASSIUM SERPL-SCNC: 4 MMOL/L — SIGNIFICANT CHANGE UP (ref 3.5–5.3)
PROT SERPL-MCNC: 6.3 G/DL — SIGNIFICANT CHANGE UP (ref 6–8.3)
PROTHROM AB SERPL-ACNC: 14.9 SEC — HIGH (ref 10.6–13.6)
RBC # BLD: 4.25 M/UL — SIGNIFICANT CHANGE UP (ref 3.8–5.2)
RBC # FLD: 15.7 % — HIGH (ref 10.3–14.5)
RH IG SCN BLD-IMP: POSITIVE — SIGNIFICANT CHANGE UP
SARS-COV-2 RNA SPEC QL NAA+PROBE: SIGNIFICANT CHANGE UP
SODIUM SERPL-SCNC: 135 MMOL/L — SIGNIFICANT CHANGE UP (ref 135–145)
WBC # BLD: 9.12 K/UL — SIGNIFICANT CHANGE UP (ref 3.8–10.5)
WBC # FLD AUTO: 9.12 K/UL — SIGNIFICANT CHANGE UP (ref 3.8–10.5)

## 2021-03-04 PROCEDURE — 71260 CT THORAX DX C+: CPT | Mod: 26

## 2021-03-04 PROCEDURE — 76705 ECHO EXAM OF ABDOMEN: CPT | Mod: 26,RT

## 2021-03-04 PROCEDURE — 74177 CT ABD & PELVIS W/CONTRAST: CPT | Mod: 26

## 2021-03-04 PROCEDURE — 99232 SBSQ HOSP IP/OBS MODERATE 35: CPT

## 2021-03-04 PROCEDURE — 99233 SBSQ HOSP IP/OBS HIGH 50: CPT

## 2021-03-04 RX ORDER — SODIUM CHLORIDE 9 MG/ML
500 INJECTION INTRAMUSCULAR; INTRAVENOUS; SUBCUTANEOUS
Refills: 0 | Status: DISCONTINUED | OUTPATIENT
Start: 2021-03-04 | End: 2021-03-06

## 2021-03-04 RX ADMIN — SODIUM CHLORIDE 50 MILLILITER(S): 9 INJECTION INTRAMUSCULAR; INTRAVENOUS; SUBCUTANEOUS at 18:49

## 2021-03-04 RX ADMIN — Medication 650 MILLIGRAM(S): at 12:30

## 2021-03-04 RX ADMIN — AMLODIPINE BESYLATE 5 MILLIGRAM(S): 2.5 TABLET ORAL at 06:29

## 2021-03-04 RX ADMIN — POLYETHYLENE GLYCOL 3350 17 GRAM(S): 17 POWDER, FOR SOLUTION ORAL at 06:29

## 2021-03-04 RX ADMIN — ENOXAPARIN SODIUM 40 MILLIGRAM(S): 100 INJECTION SUBCUTANEOUS at 18:49

## 2021-03-04 RX ADMIN — PANTOPRAZOLE SODIUM 40 MILLIGRAM(S): 20 TABLET, DELAYED RELEASE ORAL at 06:29

## 2021-03-04 RX ADMIN — Medication 650 MILLIGRAM(S): at 12:49

## 2021-03-04 RX ADMIN — POLYETHYLENE GLYCOL 3350 17 GRAM(S): 17 POWDER, FOR SOLUTION ORAL at 18:49

## 2021-03-04 NOTE — DISCHARGE NOTE PROVIDER - NSDCCPCAREPLAN_GEN_ALL_CORE_FT
PRINCIPAL DISCHARGE DIAGNOSIS  Diagnosis: Closed odontoid fracture, initial encounter  Assessment and Plan of Treatment: Please wear collar at all times.  Please follow up Neurosurgeon in one week. Please call office to make appointment. Please follow up with Primary Care Physician. Please call office to make appointment.      SECONDARY DISCHARGE DIAGNOSES  Diagnosis: Thyroid lesion  Assessment and Plan of Treatment: Please make an appointment for follow up with your primary care physician after discharge.    Diagnosis: Rheumatoid arthritis  Assessment and Plan of Treatment: Please make an appointment for follow up with your primary care physician after discharge.     PRINCIPAL DISCHARGE DIAGNOSIS  Diagnosis: Closed odontoid fracture, initial encounter  Assessment and Plan of Treatment: Please wear collar at all times.  Please follow up Neurosurgeon in one week. Please call office to make appointment. Please follow up with Primary Care Physician. Please call office to make appointment.      SECONDARY DISCHARGE DIAGNOSES  Diagnosis: Liver mass  Assessment and Plan of Treatment: Please follow up with Dr. Merida (hepatologist) after discharge.    Diagnosis: Thyroid lesion  Assessment and Plan of Treatment: Please make an appointment for follow up with your primary care physician after discharge.    Diagnosis: Rheumatoid arthritis  Assessment and Plan of Treatment: Please make an appointment for follow up with your primary care physician after discharge.

## 2021-03-04 NOTE — DISCHARGE NOTE PROVIDER - NSDCFUADDAPPT_GEN_ALL_CORE_FT
Please follow up Neurosurgeon in one week. Please call office to make appointment. Please follow up with Primary Care Physician. Please call office to make appointment.  Please follow up Neurosurgeon in one week. Please call office to make appointment. Please follow up with Primary Care Physician. Please call office to make appointment.   Please follow up with hepatologist (Dr. Merida) and oncologist (Lilli GRAY) after discharge. Please call office for appointment

## 2021-03-04 NOTE — DISCHARGE NOTE PROVIDER - HOSPITAL COURSE
89F PMHx RA, COVID (resolved), presents to ED after unwitnessed fall at home, denies LOC. Normally independent at home. CT head grossly wnl, CT C-spine shows type 2B dens Fx. Dr. Bueno discussed surgery vs. conservative management at length with patient and the family and they decided with no surgery at this time. Patient must have collar at all times.     Patient also was being followed by the hospitalist while at the hospital for transaminitis and a right upper sonogram was done which showed.....    Orthopedics saw for right scapula fracture seen on xray. Recommend 1) No scapula fracture seen on CT 2) Pain control as needed  3) WBAT RUE, sling for comfort 4) No orthopedic surgical intervention needed at this time  PT saw the patient and they recommended home PT. On the day of discharge patient is neurologically and medically stable and clear for discharge 89F PMHx RA, COVID (resolved), presents to ED after unwitnessed fall at home, denies LOC. Normally independent at home. CT head grossly wnl, CT C-spine shows type 2B dens Fx. Dr. Bueno discussed surgery vs. conservative management at length with patient and the family and they decided with no surgery at this time. Patient must have collar at all times.     Patient also was being followed by the hospitalist while at the hospital for transaminitis and a right upper sonogram was done which showed a solid 4.2 cm mass. A CT C/A/P was done which showed.....  Spoke at length with grand daughter Serena who notes the family only wants non-invasive workup and will follow up regarding this mass as outpatient. A hepatologist contact was provided for additional follow up.     Orthopedics saw for right scapula fracture seen on xray. Recommend 1) No scapula fracture seen on CT 2) Pain control as needed  3) WBAT RUE, sling for comfort 4) No orthopedic surgical intervention needed at this time  PT saw the patient and they recommended home PT. On the day of discharge patient is neurologically and medically stable and clear for discharge 89F PMHx RA, COVID (resolved), presents to ED after unwitnessed fall at home, denies LOC. Normally independent at home. CT head grossly wnl, CT C-spine shows type 2B dens Fx. Dr. Bueno discussed surgery vs. conservative management at length with patient and the family and they decided with no surgery at this time. Patient must have collar at all times. Patient also was being followed by the hospitalist while at the hospital for transaminitis and a right upper sonogram was done which showed a solid 4.2 cm liver mass. A CT C/A/P was done which showed numerous liver lesions consistent with malignancy. Per discussion with patient's granddaughter(Serena), family is leaning towards conservative management at this time given advanced age. Contact information for [facility] [phone] provided to the family if they wish to discuss further with the oncologist.   Spoke at length with grand daughter Serena who notes the family only wants non-invasive workup and will follow up regarding this mass as outpatient. A hepatologist contact was provided for additional follow up.  Orthopedics saw for right scapula fracture seen on xray. Recommend 1) No scapula fracture seen on CT 2) Pain control as needed  3) WBAT RUE, sling for comfort 4) No orthopedic surgical intervention needed at this time  PT saw the patient and they recommended home PT. On the day of discharge patient is neurologically and medically stable and clear for discharge 89F PMHx RA, COVID (resolved), presents to ED after unwitnessed fall at home, denies LOC. Normally independent at home. CT head grossly wnl, CT C-spine shows type 2B dens Fx. Dr. Bueno discussed surgery vs. conservative management at length with patient and the family and they decided with no surgery at this time. Patient must have collar at all times. Patient also was being followed by the hospitalist while at the hospital for transaminitis and a right upper sonogram was done which showed a solid 4.2 cm liver mass. A CT C/A/P was done which showed numerous liver lesions consistent with malignancy. Per discussion with patient's granddaughter(Serena), family is leaning towards conservative management at this time given advanced age. Contact information for [facility] [phone] provided to the family if they wish to discuss further with the oncologist.   Spoke at length with grand daughter Serena who notes the family only wants non-invasive workup and will follow up regarding this mass as outpatient. A hepatologist contact was provided for additional follow up.  Orthopedics saw for right scapula fracture seen on xray. Recommend 1) No scapula fracture seen on CT 2) Pain control as needed  3) WBAT RUE, sling for comfort 4) No orthopedic surgical intervention needed at this time. Cleared by hospitalist and orthopedics to be discharged.   PT saw the patient and they recommended home PT. On the day of discharge patient is neurologically and medically stable and clear for discharge

## 2021-03-04 NOTE — DISCHARGE NOTE PROVIDER - NSDCMRMEDTOKEN_GEN_ALL_CORE_FT
Cervical collar for dens fracture:   Norvasc 5 mg oral tablet: 1 tab(s) orally once a day  omeprazole 40 mg oral delayed release capsule: 1 cap(s) orally once a day   acetaminophen 325 mg oral tablet: 2 tab(s) orally every 6 hours, As needed, Temp greater or equal to 38C (100.4F), Mild Pain (1 - 3)  Cervical collar for dens fracture:   Norvasc 5 mg oral tablet: 1 tab(s) orally once a day  omeprazole 40 mg oral delayed release capsule: 1 cap(s) orally once a day  polyethylene glycol 3350 oral powder for reconstitution: 17 gram(s) orally 2 times a day  senna oral tablet: 2 tab(s) orally once a day (at bedtime), As needed, Constipation

## 2021-03-04 NOTE — DISCHARGE NOTE PROVIDER - CARE PROVIDER_API CALL
Blane Bueno  NEUROSURGERY  [address], [address]  [place] [address]  Phone: [phone]  Fax: [phone]  Follow Up Time:     Johann Merida)  Gastroenterology; Internal Medicine  [address] [place] [address]  Phone: [phone]  Fax: [phone]  Follow Up Time:

## 2021-03-04 NOTE — PROGRESS NOTE ADULT - SUBJECTIVE AND OBJECTIVE BOX
[facility] Division of Hospital Medicine  Nahomy Cristina MD  spectra [id number]    Patient is a 89y old  Female who presents with a chief complaint of odontoid Fx (04 Mar 2021 09:08)      SUBJECTIVE / OVERNIGHT EVENTS: no acute events overnight. patient denies pain including ab pain.   ADDITIONAL REVIEW OF SYSTEMS:    MEDICATIONS  (STANDING):  amLODIPine   Tablet 5 milliGRAM(s) Oral daily  enoxaparin Injectable 40 milliGRAM(s) SubCutaneous <User Schedule>  pantoprazole    Tablet 40 milliGRAM(s) Oral before breakfast  polyethylene glycol 3350 17 Gram(s) Oral two times a day    MEDICATIONS  (PRN):  acetaminophen   Tablet .. 650 milliGRAM(s) Oral every 6 hours PRN Temp greater or equal to 38C (100.4F), Mild Pain (1 - 3)  bisacodyl 5 milliGRAM(s) Oral daily PRN Constipation  senna 2 Tablet(s) Oral at bedtime PRN Constipation      CAPILLARY BLOOD GLUCOSE        I&O's Summary    03 Mar 2021 07:01  -  04 Mar 2021 07:00  --------------------------------------------------------  IN: 600 mL / OUT: 450 mL / NET: 150 mL    04 Mar 2021 07:01  -  04 Mar 2021 11:35  --------------------------------------------------------  IN: 120 mL / OUT: 0 mL / NET: 120 mL        PHYSICAL EXAM:  Vital Signs Last 24 Hrs  T(C): 36.8 (04 Mar 2021 09:11), Max: 37 (03 Mar 2021 21:00)  T(F): 98.2 (04 Mar 2021 09:11), Max: 98.6 (03 Mar 2021 21:00)  HR: 96 (04 Mar 2021 09:11) (91 - 105)  BP: 137/72 (04 Mar 2021 09:11) (131/63 - 156/65)  BP(mean): --  RR: 18 (04 Mar 2021 09:11) (18 - 18)  SpO2: 95% (04 Mar 2021 09:11) (95% - 98%)    CONSTITUTIONAL: NAD, well-developed, well-groomed  NECK: in c-collar  RESPIRATORY: Normal respiratory effort; lungs are clear to auscultation bilaterally  CARDIOVASCULAR: normal S1 and S2, no murmur/rub/gallop; No lower extremity edema  ABDOMEN: Nontender to palpation, normoactive bowel sounds, no rebound/guarding; No hepatosplenomegaly  MUSCULOSKELETAL: no clubbing or cyanosis of digits; no joint swelling or tenderness to palpation, moves all extremities   PSYCH: A+O to person, place, and time; affect appropriate  NEUROLOGY: CN 2-12 are intact and symmetric; no gross sensory deficits   SKIN: No rashes; no palpable lesions, + ecchymosis in right eye    LABS:                        12.5   9.12  )-----------( 217      ( 04 Mar 2021 07:56 )             37.9     03-    135  |  104  |  17  ----------------------------<  88  4.0   |  20<L>  |  0.66    Ca    8.9      04 Mar 2021 07:56    TPro  6.3  /  Alb  3.2<L>  /  TBili  3.8<H>  /  DBili  x   /  AST  36  /  ALT  18  /  AlkPhos  196<H>  03-04          Urinalysis Basic - ( 03 Mar 2021 00:10 )    Color: Yellow / Appearance: Clear / S.022 / pH: x  Gluc: x / Ketone: Negative  / Bili: Negative / Urobili: Negative   Blood: x / Protein: Negative / Nitrite: Negative   Leuk Esterase: Large / RBC: 3 /hpf / WBC 4 /HPF   Sq Epi: x / Non Sq Epi: 2 /hpf / Bacteria: Negative        Culture - Urine (collected 03 Mar 2021 03:09)  Source: .Urine Clean Catch (Midstream)  Final Report (03 Mar 2021 22:39):    <10,000 CFU/mL Normal Urogenital Maryam        RADIOLOGY & ADDITIONAL TESTS:  Results Reviewed:     < from: MR Cervical Spine No Cont (21 @ 22:53) >    IMPRESSION: Fracture involving the dens with associated edema.    Abnormal lesions involving the thyroid.    < end of copied text >    < from: CT Shoulder No Cont, Right (21 @ 17:52) >  Impression:  Limited evaluation. Right shoulder arthrosis consistent with known rheumatoid arthritis with moderate secondary osteoarthritis. No definite acute fracture identified.    < end of copied text >    Imaging Personally Reviewed:  Electrocardiogram Personally Reviewed:    COORDINATION OF CARE:  Care Discussed with Consultants/Other Providers [Y/N]: neurosurgery PA(Zakiaijiparis)  Prior or Outpatient Records Reviewed [Y/N]:

## 2021-03-04 NOTE — DISCHARGE NOTE PROVIDER - NSDCFUADDINST_GEN_ALL_CORE_FT
Collar at all times.  NO heavy lifting, strenous activity, twisting, bending, driving, or working until cleared by your physician.  Please return to the emergency department if you develop changes in mental status, seizures, fainting, dizziness, changes in vision, lethargy, nausea, vomiting, chest pain, shortness of breathe or severe pain.

## 2021-03-04 NOTE — CHART NOTE - NSCHARTNOTEFT_GEN_A_CORE
Per Neurosurgery request patient seen for evaluation of cervical orthosis. Current x-small [place] J was modified and refitted to improve stability and comfort. To notify office with any other issues questions or concerns.    Luis DOMINGUEZ.  [facility]  [phone]

## 2021-03-04 NOTE — PROGRESS NOTE ADULT - SUBJECTIVE AND OBJECTIVE BOX
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 Normally independent at home. CT head grossly wnl, CT C-spine shows type 2B dens Fx. (03 Mar 2021 02:12)    OVERNIGHT EVENTS:  Vital Signs Last 24 Hrs  T(C): 36.7 (04 Mar 2021 00:39), Max: 37 (03 Mar 2021 21:00)  T(F): 98 (04 Mar 2021 00:39), Max: 98.6 (03 Mar 2021 21:00)  HR: 98 (04 Mar 2021 00:39) (91 - 105)  BP: 131/63 (04 Mar 2021 00:39) (131/63 - 158/69)  BP(mean): --  RR: 18 (04 Mar 2021 00:39) (18 - 18)  SpO2: 95% (04 Mar 2021 00:39) (95% - 98%)    I&O's Detail    03 Mar 2021 07:01  -  04 Mar 2021 07:00  --------------------------------------------------------  IN:    Oral Fluid: 600 mL  Total IN: 600 mL    OUT:    Voided (mL): 450 mL  Total OUT: 450 mL    Total NET: 150 mL        I&O's Summary    03 Mar 2021 07:01  -  04 Mar 2021 07:00  --------------------------------------------------------  IN: 600 mL / OUT: 450 mL / NET: 150 mL        PHYSICAL EXAM:  Neurological:    Motor exam:         [x] Upper extremity                 D             B          T          WE       WF      HI                                               R         5/5        5/5        5/5       5/5     5/5       5/5                                               L          5/5        5/5        5/5       5/5     5/5       5/5         [x] Lower extremity                Ps          Ha        Quad    EHL        FHL                                               R        5/5        5/5        5/5       5/5         5/5                                               L         5/5        5/5       5/5       5/5          5/5                                                        [] warm well perfused; capillary refill <3 seconds     Sensation: [x] intact to light touch  [] decreased:     DTR's 1/2           Gait NT    Cardiovascular:  Respiratory:  Gastrointestinal:  Genitourinary:  Extremities:  Incision/Wound:    TUBES/LINES:  [] CVC  [] A-line  [] Lumbar Drain  [] Ventriculostomy  [] Other    DIET:  [] NPO  [] Mechanical  [] Tube feeds    LABS:                        12.5   9.12  )-----------( 217      ( 04 Mar 2021 07:56 )             37.9         135  |  104  |  17  ----------------------------<  88  4.0   |  20<L>  |  0.66    Ca    8.9      04 Mar 2021 07:56    TPro  6.3  /  Alb  3.2<L>  /  TBili  3.8<H>  /  DBili  x   /  AST  36  /  ALT  18  /  AlkPhos  196<H>        Urinalysis Basic - ( 03 Mar 2021 00:10 )    Color: Yellow / Appearance: Clear / S.022 / pH: x  Gluc: x / Ketone: Negative  / Bili: Negative / Urobili: Negative   Blood: x / Protein: Negative / Nitrite: Negative   Leuk Esterase: Large / RBC: 3 /hpf / WBC 4 /HPF   Sq Epi: x / Non Sq Epi: 2 /hpf / Bacteria: Negative          CAPILLARY BLOOD GLUCOSE              Drug Levels: [] N/A    CSF Analysis: [] N/A      Allergies    penicillin (Unknown)    Intolerances      MEDICATIONS:  Antibiotics:    Neuro:  acetaminophen   Tablet .. 650 milliGRAM(s) Oral every 6 hours PRN    Anticoagulation:  enoxaparin Injectable 40 milliGRAM(s) SubCutaneous <User Schedule>    OTHER:  amLODIPine   Tablet 5 milliGRAM(s) Oral daily  bisacodyl 5 milliGRAM(s) Oral daily PRN  pantoprazole    Tablet 40 milliGRAM(s) Oral before breakfast  polyethylene glycol 3350 17 Gram(s) Oral two times a day  senna 2 Tablet(s) Oral at bedtime PRN    IVF:    CULTURES:  Culture Results:   <10,000 CFU/mL Normal Urogenital Maryam ( @ 03:09)    RADIOLOGY & ADDITIONAL TESTS:      ASSESSMENT:  HPI:  89F PMHx RA, COVID (resolved), presents to ED after unwitnessed fall at home, denies LOC. Normally independent at home. CT head grossly wnl, CT C-spine shows type 2B dens Fx. (03 Mar 2021 02:12)    89y Female s/p    PLAN:  NEURO:  CARDIOVASCULAR:  PULMONARY:  RENAL:  GI:  HEME:  ID:  ENDO:    DVT PROPHYLAXIS:  [x] Venodynes                                [x] Heparin/Lovenox    FALL RISK:  [] Low Risk                                    [] Impulsive

## 2021-03-04 NOTE — DISCHARGE NOTE PROVIDER - CARE PROVIDERS DIRECT ADDRESSES
[email or web address]

## 2021-03-04 NOTE — PROGRESS NOTE ADULT - SUBJECTIVE AND OBJECTIVE BOX
REFUSED VITAL SIGNS. SUBJECTIVE: Patient seen and examined using  Keven (ID [id number]). Complains of neck pain. + collar in place     OVERNIGHT EVENTS: none     Vital Signs Last 24 Hrs  T(C): 36.8 (04 Mar 2021 09:11), Max: 37 (03 Mar 2021 21:00)  T(F): 98.2 (04 Mar 2021 09:11), Max: 98.6 (03 Mar 2021 21:00)  HR: 96 (04 Mar 2021 09:11) (91 - 105)  BP: 137/72 (04 Mar 2021 09:11) (131/63 - 156/65)  BP(mean): --  RR: 18 (04 Mar 2021 09:11) (18 - 18)  SpO2: 95% (04 Mar 2021 09:11) (95% - 98%)  PHYSICAL EXAM:    General: No Acute Distress     Neurological: Awake, alert oriented to person, place and time, Following Commands, PERRL, EOMI, Face Symmetrical, Speech Fluent, Moving all extremities, Muscle Strength normal in all four extremities, No Drift, Sensation to Light Touch Intact    Pulmonary: Clear to Auscultation, No Rales, No Rhonchi, No Wheezes     Cardiovascular: S1, S2, Regular Rate and Rhythm     Gastrointestinal: Soft, Nontender, Nondistended     LABS:                                   12.5   9.12  )-----------( 217      ( 04 Mar 2021 07:56 )             37.9     03-04    135  |  104  |  17  ----------------------------<  88  4.0   |  20<L>  |  0.66    Ca    8.9      04 Mar 2021 07:56    TPro  6.3  /  Alb  3.2<L>  /  TBili  3.8<H>  /  DBili  x   /  AST  36  /  ALT  18  /  AlkPhos  196<H>  03-04    MEDICATIONS  (STANDING):  amLODIPine   Tablet 5 milliGRAM(s) Oral daily  enoxaparin Injectable 40 milliGRAM(s) SubCutaneous <User Schedule>  pantoprazole    Tablet 40 milliGRAM(s) Oral before breakfast  polyethylene glycol 3350 17 Gram(s) Oral two times a day    MEDICATIONS  (PRN):  acetaminophen   Tablet .. 650 milliGRAM(s) Oral every 6 hours PRN Temp greater or equal to 38C (100.4F), Mild Pain (1 - 3)  bisacodyl 5 milliGRAM(s) Oral daily PRN Constipation  senna 2 Tablet(s) Oral at bedtime PRN Constipation      DIET: regular diet      IMAGING:     < from: MR Cervical Spine No Cont (03.03.21 @ 22:53) >    INTERPRETATION:  Clinical indication: Type II dens fracture.    MRI of the cervical spine was performed using sagittal T1 T2 T2 with sequence fat suppressionand STIR sequence. Axial T2 gradient echo and T1-weighted sequences were performed.    This exam is compared with prior CT scan cervical spine performed on March 2, 2020.    Fracture involving the dens is identified. Abnormal T2 prolongation is associated with the fracture fragments which is likely compatible areas of edema. This fracture is better demonstrated on prior CT scan.    Reversal of the normal cervical lordosis seen.    There is partial fusion seen involving the C5-6 level which is likely secondary to chronic degenerative changes.    Disc desiccation seen throughout the cervical and upper thoracic spine region    Slight anterior displacement is seen involving C7 relative to T1, T1 relative to T2, and T3 relative to C4.    Mild compression deformity seen involving T6 vertebral body. No significant retropulsed fragment is seen.    C2-3: Mild disc bulge is seen without significant, spinal canal or either neural foramen    C3-4: Disc osteophyte complex is seen. Mild narrowing of the spinal canal. Moderate to severe narrowing of the right neural foramen    C4-5: Disc osteophyte complex is seen. Bilateral hypertrophic facet joint changes seen. Mild to moderate narrowing of the spinal canal and moderate narrowing of both neural foramen.    C5-6: Disc osteophyte complex is seen. Mild narrowing of the spinal canal. Moderate to severe narrowing of the left neural foramen.    C6-7: Bilateral hypertrophic facet changes are seen. Moderate to severe narrowing of the left neural foramen    C7-T1: Normal    T1-2: Normal    The spinal cord demonstrates normal signal.    Evaluation of the paraspinal soft tissues appear normal    There is evidence of a large lesion seen involving left lobe of thyroid with small lesion seen involving both lobes as well. The possibility of a neoplastic processes cannot be entirely excluded. Clinical correlation is recommended. Dedicated imaging of the thyroid can be done for further evaluation.    IMPRESSION: Fracture involving the dens with associated edema.    Degenerative changes as described above    < end of copied text >    < from: CT 3D Reconstruct w/ Workstation (03.03.21 @ 18:02) >  Impression:  Limited evaluation. Right shoulder arthrosis consistent with known rheumatoid arthritis with moderate secondary osteoarthritis. No definite acute fracture identified.    < end of copied text >      < from: CT Head No Cont (03.02.21 @ 23:27) >  IMPRESSION:  Head CT: Small right frontal scalp hematoma. No acute intracranial hemorrhage, vasogenic edema, extra-axial calvarial fracture.    Cervical spine CT: Type II dens fracture. Cervical spondylosis as above.    < end of copied text >    < from: Xray Shoulder 2 Views, Right (03.02.21 @ 23:15) >  IMPRESSION:  Cortical step-off along the inferior medial border of the scapular body consistent with scapular fracture.    < end of copied text >    < from: Xray Chest 1 View- PORTABLE-Urgent (Xray Chest 1 View- PORTABLE-Urgent .) (03.02.21 @ 23:15) >  IMPRESSION: Clear lungs.    < end of copied text >

## 2021-03-04 NOTE — DISCHARGE NOTE PROVIDER - NSFOLLOWUPCLINICS_GEN_ALL_ED_FT
[facility]  Hematology/Oncology  [address] [id number]  Phone: [phone]  Fax:   Follow Up Time:

## 2021-03-04 NOTE — PROGRESS NOTE ADULT - PROBLEM SELECTOR PLAN 4
incidentally noted on MR cspine  outpatient follow up with PMD advised (discussed with granddaughter, Erasmo Fredy)

## 2021-03-05 LAB
ALBUMIN SERPL ELPH-MCNC: 3.2 G/DL — LOW (ref 3.3–5)
ALP SERPL-CCNC: 201 U/L — HIGH (ref 40–120)
ALT FLD-CCNC: 16 U/L — SIGNIFICANT CHANGE UP (ref 10–45)
ANION GAP SERPL CALC-SCNC: 14 MMOL/L — SIGNIFICANT CHANGE UP (ref 5–17)
AST SERPL-CCNC: 36 U/L — SIGNIFICANT CHANGE UP (ref 10–40)
BILIRUB SERPL-MCNC: 3 MG/DL — HIGH (ref 0.2–1.2)
BUN SERPL-MCNC: 17 MG/DL — SIGNIFICANT CHANGE UP (ref 7–23)
CALCIUM SERPL-MCNC: 8.7 MG/DL — SIGNIFICANT CHANGE UP (ref 8.4–10.5)
CHLORIDE SERPL-SCNC: 105 MMOL/L — SIGNIFICANT CHANGE UP (ref 96–108)
CO2 SERPL-SCNC: 20 MMOL/L — LOW (ref 22–31)
CREAT SERPL-MCNC: 0.64 MG/DL — SIGNIFICANT CHANGE UP (ref 0.5–1.3)
GLUCOSE SERPL-MCNC: 77 MG/DL — SIGNIFICANT CHANGE UP (ref 70–99)
POTASSIUM SERPL-MCNC: 4 MMOL/L — SIGNIFICANT CHANGE UP (ref 3.5–5.3)
POTASSIUM SERPL-SCNC: 4 MMOL/L — SIGNIFICANT CHANGE UP (ref 3.5–5.3)
PROT SERPL-MCNC: 6.4 G/DL — SIGNIFICANT CHANGE UP (ref 6–8.3)
SODIUM SERPL-SCNC: 139 MMOL/L — SIGNIFICANT CHANGE UP (ref 135–145)

## 2021-03-05 PROCEDURE — 99232 SBSQ HOSP IP/OBS MODERATE 35: CPT

## 2021-03-05 PROCEDURE — 99233 SBSQ HOSP IP/OBS HIGH 50: CPT

## 2021-03-05 RX ADMIN — POLYETHYLENE GLYCOL 3350 17 GRAM(S): 17 POWDER, FOR SOLUTION ORAL at 05:34

## 2021-03-05 RX ADMIN — POLYETHYLENE GLYCOL 3350 17 GRAM(S): 17 POWDER, FOR SOLUTION ORAL at 18:27

## 2021-03-05 RX ADMIN — PANTOPRAZOLE SODIUM 40 MILLIGRAM(S): 20 TABLET, DELAYED RELEASE ORAL at 05:33

## 2021-03-05 RX ADMIN — AMLODIPINE BESYLATE 5 MILLIGRAM(S): 2.5 TABLET ORAL at 05:33

## 2021-03-05 RX ADMIN — ENOXAPARIN SODIUM 40 MILLIGRAM(S): 100 INJECTION SUBCUTANEOUS at 18:27

## 2021-03-05 NOTE — PROGRESS NOTE ADULT - SUBJECTIVE AND OBJECTIVE BOX
[facility] Division of Hospital Medicine  Nahomy Cristina MD  spectra [phone]    Patient is a 89y old  Female who presents with a chief complaint of odontoid Fx (05 Mar 2021 09:41)      SUBJECTIVE / OVERNIGHT EVENTS: no acute events overnight. patient denies any pain. tolerating diet. no BM yet.  ADDITIONAL REVIEW OF SYSTEMS:    MEDICATIONS  (STANDING):  amLODIPine   Tablet 5 milliGRAM(s) Oral daily  enoxaparin Injectable 40 milliGRAM(s) SubCutaneous <User Schedule>  pantoprazole    Tablet 40 milliGRAM(s) Oral before breakfast  polyethylene glycol 3350 17 Gram(s) Oral two times a day  sodium chloride 0.9%. 500 milliLiter(s) (50 mL/Hr) IV Continuous <Continuous>    MEDICATIONS  (PRN):  acetaminophen   Tablet .. 650 milliGRAM(s) Oral every 6 hours PRN Temp greater or equal to 38C (100.4F), Mild Pain (1 - 3)  bisacodyl 5 milliGRAM(s) Oral daily PRN Constipation  senna 2 Tablet(s) Oral at bedtime PRN Constipation      CAPILLARY BLOOD GLUCOSE        I&O's Summary    04 Mar 2021 07:01  -  05 Mar 2021 07:00  --------------------------------------------------------  IN: 1010 mL / OUT: 250 mL / NET: 760 mL    05 Mar 2021 07:01  -  05 Mar 2021 12:12  --------------------------------------------------------  IN: 120 mL / OUT: 0 mL / NET: 120 mL        PHYSICAL EXAM:  Vital Signs Last 24 Hrs  T(C): 36.5 (05 Mar 2021 10:11), Max: 36.8 (04 Mar 2021 23:44)  T(F): 97.7 (05 Mar 2021 10:11), Max: 98.2 (04 Mar 2021 23:44)  HR: 100 (05 Mar 2021 10:11) (82 - 100)  BP: 140/57 (05 Mar 2021 10:11) (130/57 - 150/63)  BP(mean): --  RR: 18 (05 Mar 2021 10:11) (18 - 18)  SpO2: 98% (05 Mar 2021 10:11) (95% - 98%)    CONSTITUTIONAL: NAD, well-developed, well-groomed  NECK: Supple, no palpable masses; no thyromegaly  RESPIRATORY: Normal respiratory effort; lungs are clear to auscultation bilaterally  CARDIOVASCULAR: normal S1 and S2, no murmur/rub/gallop; No lower extremity edema  ABDOMEN: Nontender to palpation, normoactive bowel sounds, no rebound/guarding; No hepatosplenomegaly  MUSCULOSKELETAL:  no clubbing or cyanosis of digits; no joint swelling or tenderness to palpation  PSYCH: A+O to person, place, and time; affect appropriate   SKIN: No rashes; no palpable lesions    LABS:                        12.5   9.12  )-----------( 217      ( 04 Mar 2021 07:56 )             37.9     03-05    139  |  105  |  17  ----------------------------<  77  4.0   |  20<L>  |  0.64    Ca    8.7      05 Mar 2021 06:58    TPro  6.4  /  Alb  3.2<L>  /  TBili  3.0<H>  /  DBili  x   /  AST  36  /  ALT  16  /  AlkPhos  201<H>  03-05    PT/INR - ( 04 Mar 2021 11:26 )   PT: 14.9 sec;   INR: 1.28 ratio         PTT - ( 04 Mar 2021 11:26 )  PTT:35.3 sec          Culture - Urine (collected 03 Mar 2021 03:09)  Source: .Urine Clean Catch (Midstream)  Final Report (03 Mar 2021 22:39):    <10,000 CFU/mL Normal Urogenital Maryam        RADIOLOGY & ADDITIONAL TESTS:  Results Reviewed:   Imaging Personally Reviewed:  Electrocardiogram Personally Reviewed:    COORDINATION OF CARE:  Care Discussed with Consultants/Other Providers [Y/N]:  Prior or Outpatient Records Reviewed [Y/N]:   [facility] Division of Hospital Medicine  Nahomy Cristina MD  spectra [phone]    Patient is a 89y old  Female who presents with a chief complaint of odontoid Fx (05 Mar 2021 09:41)      SUBJECTIVE / OVERNIGHT EVENTS: no acute events overnight. patient denies any pain. tolerating diet. no BM yet.  ADDITIONAL REVIEW OF SYSTEMS:    MEDICATIONS  (STANDING):  amLODIPine   Tablet 5 milliGRAM(s) Oral daily  enoxaparin Injectable 40 milliGRAM(s) SubCutaneous <User Schedule>  pantoprazole    Tablet 40 milliGRAM(s) Oral before breakfast  polyethylene glycol 3350 17 Gram(s) Oral two times a day  sodium chloride 0.9%. 500 milliLiter(s) (50 mL/Hr) IV Continuous <Continuous>    MEDICATIONS  (PRN):  acetaminophen   Tablet .. 650 milliGRAM(s) Oral every 6 hours PRN Temp greater or equal to 38C (100.4F), Mild Pain (1 - 3)  bisacodyl 5 milliGRAM(s) Oral daily PRN Constipation  senna 2 Tablet(s) Oral at bedtime PRN Constipation      CAPILLARY BLOOD GLUCOSE        I&O's Summary    04 Mar 2021 07:01  -  05 Mar 2021 07:00  --------------------------------------------------------  IN: 1010 mL / OUT: 250 mL / NET: 760 mL    05 Mar 2021 07:01  -  05 Mar 2021 12:12  --------------------------------------------------------  IN: 120 mL / OUT: 0 mL / NET: 120 mL        PHYSICAL EXAM:  Vital Signs Last 24 Hrs  T(C): 36.5 (05 Mar 2021 10:11), Max: 36.8 (04 Mar 2021 23:44)  T(F): 97.7 (05 Mar 2021 10:11), Max: 98.2 (04 Mar 2021 23:44)  HR: 100 (05 Mar 2021 10:11) (82 - 100)  BP: 140/57 (05 Mar 2021 10:11) (130/57 - 150/63)  BP(mean): --  RR: 18 (05 Mar 2021 10:11) (18 - 18)  SpO2: 98% (05 Mar 2021 10:11) (95% - 98%)    CONSTITUTIONAL: NAD, well-developed, well-groomed  NECK: in c-collar  RESPIRATORY: Normal respiratory effort; lungs are clear to auscultation bilaterally  CARDIOVASCULAR: normal S1 and S2, no murmur/rub/gallop; No lower extremity edema  ABDOMEN: Nontender to palpation, normoactive bowel sounds, no rebound/guarding; No hepatosplenomegaly  MUSCULOSKELETAL: no clubbing or cyanosis of digits; no joint swelling or tenderness to palpation, moves all extremities   PSYCH: A+O to person, place, and time; affect appropriate  NEUROLOGY: CN 2-12 are intact and symmetric; no gross sensory deficits   SKIN: No rashes; no palpable lesions, + ecchymosis in right eye    LABS:                        12.5   9.12  )-----------( 217      ( 04 Mar 2021 07:56 )             37.9     03-05    139  |  105  |  17  ----------------------------<  77  4.0   |  20<L>  |  0.64    Ca    8.7      05 Mar 2021 06:58    TPro  6.4  /  Alb  3.2<L>  /  TBili  3.0<H>  /  DBili  x   /  AST  36  /  ALT  16  /  AlkPhos  201<H>  03-05    PT/INR - ( 04 Mar 2021 11:26 )   PT: 14.9 sec;   INR: 1.28 ratio         PTT - ( 04 Mar 2021 11:26 )  PTT:35.3 sec          Culture - Urine (collected 03 Mar 2021 03:09)  Source: .Urine Clean Catch (Midstream)  Final Report (03 Mar 2021 22:39):    <10,000 CFU/mL Normal Urogenital Maryam        RADIOLOGY & ADDITIONAL TESTS:  Results Reviewed:     < from: CT Chest w/ IV Cont (03.04.21 @ 19:44) >  IMPRESSION:  Numerous liver lesions the largest measuring up to 6.7 cm in the right hepatic lobe, consistent with malignancy. Findings favor metastatic disease.  No evidence of metastatic disease in the chest.  No evidence of suspicious adenopathy in the chest, abdomen, or pelvis.    < end of copied text >    Imaging Personally Reviewed:  Electrocardiogram Personally Reviewed:    COORDINATION OF CARE:  Care Discussed with Consultants/Other Providers [Y/N]: neurosurgery PA(Awijiparis), Dr. Santos(PMD)  Prior or Outpatient Records Reviewed [Y/N]:

## 2021-03-05 NOTE — PROGRESS NOTE ADULT - PROBLEM SELECTOR PLAN 4
incident finding on imaging (+ multiple thyroid nodules, largest 3.2cm in left thyroid)  outpatient follow up with PMD advised

## 2021-03-05 NOTE — PROGRESS NOTE ADULT - SUBJECTIVE AND OBJECTIVE BOX
SUBJECTIVE: Patient seen and examined using  Anurag (ID [id number]). Complains of neck pain. + collar in place. Patient had a RUQ sonogram which showed a liver mass. CT C/A/P pending for further workup as per hospitalist.      OVERNIGHT EVENTS: none     Vital Signs Last 24 Hrs  T(C): 36.8 (05 Mar 2021 04:22), Max: 36.8 (04 Mar 2021 23:44)  T(F): 98.2 (05 Mar 2021 04:22), Max: 98.2 (04 Mar 2021 23:44)  HR: 83 (05 Mar 2021 04:22) (82 - 99)  BP: 137/63 (05 Mar 2021 04:22) (130/57 - 150/63)  BP(mean): --  RR: 18 (05 Mar 2021 04:22) (18 - 18)  SpO2: 96% (05 Mar 2021 04:22) (95% - 97%)    PHYSICAL EXAM:    General: No Acute Distress     Neurological: Awake, alert oriented to person, place and time, Following Commands, PERRL, EOMI, Face Symmetrical, Speech Fluent, Moving all extremities, Muscle Strength normal in all four extremities, No Drift, Sensation to Light Touch Intact    Pulmonary: Clear to Auscultation, No Rales, No Rhonchi, No Wheezes     Cardiovascular: S1, S2, Regular Rate and Rhythm     Gastrointestinal: Soft, Nontender, Nondistended     LABS:                                   12.5   9.12  )-----------( 217      ( 04 Mar 2021 07:56 )             37.9     03-04    03-05    139  |  105  |  17  ----------------------------<  77  4.0   |  20<L>  |  0.64    Ca    8.7      05 Mar 2021 06:58    TPro  6.4  /  Alb  3.2<L>  /  TBili  3.0<H>  /  DBili  x   /  AST  36  /  ALT  16  /  AlkPhos  201<H>  03-05      MEDICATIONS  (STANDING):  amLODIPine   Tablet 5 milliGRAM(s) Oral daily  enoxaparin Injectable 40 milliGRAM(s) SubCutaneous <User Schedule>  pantoprazole    Tablet 40 milliGRAM(s) Oral before breakfast  polyethylene glycol 3350 17 Gram(s) Oral two times a day  sodium chloride 0.9%. 500 milliLiter(s) (50 mL/Hr) IV Continuous <Continuous>    MEDICATIONS  (PRN):  acetaminophen   Tablet .. 650 milliGRAM(s) Oral every 6 hours PRN Temp greater or equal to 38C (100.4F), Mild Pain (1 - 3)  bisacodyl 5 milliGRAM(s) Oral daily PRN Constipation  senna 2 Tablet(s) Oral at bedtime PRN Constipation      DIET: regular diet      IMAGING:     < from: US Abdomen Upper Quadrant Right (03.04.21 @ 14:13) >  IMPRESSION:    A 3.6 x 3.2x 4.2 cm solid mass is seen in the liver. Contrast-enhanced CT or MR is recommended for further characterization if not contraindicated.    Gallbladder is not visualized. Correlation with surgical history is recommended.    < end of copied text >      < from: MR Cervical Spine No Cont (03.03.21 @ 22:53) >    INTERPRETATION:  Clinical indication: Type II dens fracture.    MRI of the cervical spine was performed using sagittal T1 T2 T2 with sequence fat suppressionand STIR sequence. Axial T2 gradient echo and T1-weighted sequences were performed.    This exam is compared with prior CT scan cervical spine performed on March 2, 2020.    Fracture involving the dens is identified. Abnormal T2 prolongation is associated with the fracture fragments which is likely compatible areas of edema. This fracture is better demonstrated on prior CT scan.    Reversal of the normal cervical lordosis seen.    There is partial fusion seen involving the C5-6 level which is likely secondary to chronic degenerative changes.    Disc desiccation seen throughout the cervical and upper thoracic spine region    Slight anterior displacement is seen involving C7 relative to T1, T1 relative to T2, and T3 relative to C4.    Mild compression deformity seen involving T6 vertebral body. No significant retropulsed fragment is seen.    C2-3: Mild disc bulge is seen without significant, spinal canal or either neural foramen    C3-4: Disc osteophyte complex is seen. Mild narrowing of the spinal canal. Moderate to severe narrowing of the right neural foramen    C4-5: Disc osteophyte complex is seen. Bilateral hypertrophic facet joint changes seen. Mild to moderate narrowing of the spinal canal and moderate narrowing of both neural foramen.    C5-6: Disc osteophyte complex is seen. Mild narrowing of the spinal canal. Moderate to severe narrowing of the left neural foramen.    C6-7: Bilateral hypertrophic facet changes are seen. Moderate to severe narrowing of the left neural foramen    C7-T1: Normal    T1-2: Normal    The spinal cord demonstrates normal signal.    Evaluation of the paraspinal soft tissues appear normal    There is evidence of a large lesion seen involving left lobe of thyroid with small lesion seen involving both lobes as well. The possibility of a neoplastic processes cannot be entirely excluded. Clinical correlation is recommended. Dedicated imaging of the thyroid can be done for further evaluation.    IMPRESSION: Fracture involving the dens with associated edema.    Degenerative changes as described above    < end of copied text >    < from: CT 3D Reconstruct w/ Workstation (03.03.21 @ 18:02) >  Impression:  Limited evaluation. Right shoulder arthrosis consistent with known rheumatoid arthritis with moderate secondary osteoarthritis. No definite acute fracture identified.    < end of copied text >      < from: CT Head No Cont (03.02.21 @ 23:27) >  IMPRESSION:  Head CT: Small right frontal scalp hematoma. No acute intracranial hemorrhage, vasogenic edema, extra-axial calvarial fracture.    Cervical spine CT: Type II dens fracture. Cervical spondylosis as above.    < end of copied text >    < from: Xray Shoulder 2 Views, Right (03.02.21 @ 23:15) >  IMPRESSION:  Cortical step-off along the inferior medial border of the scapular body consistent with scapular fracture.    < end of copied text >    < from: Xray Chest 1 View- PORTABLE-Urgent (Xray Chest 1 View- PORTABLE-Urgent .) (03.02.21 @ 23:15) >  IMPRESSION: Clear lungs.    < end of copied text >

## 2021-03-05 NOTE — PROGRESS NOTE ADULT - PROBLEM SELECTOR PLAN 3
elevated bilirubin and alk phos  check RUQ sono(ordered)  no GI symptoms at this time
elevated bilirubin and alk phos  RUQ with + solid liver mass. CT c/a/p with numerous liver lesions consistent with malignancy  per discussion with patient's granddaughter(Serena), family is leaning towards conservative management at this time given advanced age.  contact information for [facility] [phone] provided to the family if they wish to discuss further with the oncologist.

## 2021-03-05 NOTE — PROGRESS NOTE ADULT - PROBLEM SELECTOR PLAN 6
not on chronic disease modifying meds at home and only takes pain medication as needed per granddaughter.
not on chronic disease modifying meds at home and only takes pain medication as needed per granddaughter.

## 2021-03-05 NOTE — PROGRESS NOTE ADULT - PROBLEM SELECTOR PLAN 7
continue lovenox for DVT ppx. LE duplex on 3/3 negative for DVT.  continue bowel regimen. + documented BM on 3/4.   PT rec home PT. anticipate d/c home tomorrow. d/w granddaughter
continue lovenox for DVT ppx. LE duplex on 3/3 negative for DVT.  continue bowel regimen.  PT rec home PT. anticipate d/c home tomorrow. d/w granddaughter

## 2021-03-05 NOTE — DISCHARGE NOTE NURSING/CASE MANAGEMENT/SOCIAL WORK - CAREGIVER PHONE NUMBER
Pt is to have a ultrasound around day 12-14, however pt noted is scheduled on 9/7/2018 day 20 of cycle. Message is left for pt to call.   [phone]

## 2021-03-05 NOTE — PROGRESS NOTE ADULT - CONVERSATION DETAILS
I discussed with the patient's granddaughter(Serena) regarding CT findings concerning for malignancy. Family is opting to pursue conservative management at this time given the patient's advanced age and frail condition. I advised Serena to start talking about advance directives and code status with the rest of her family given the patient's guarded prognosis and her condition may deteriorate over time.

## 2021-03-05 NOTE — DISCHARGE NOTE NURSING/CASE MANAGEMENT/SOCIAL WORK - NSDCFUADDAPPT_GEN_ALL_CORE_FT
Please follow up Neurosurgeon in one week. Please call office to make appointment. Please follow up with Primary Care Physician. Please call office to make appointment.

## 2021-03-05 NOTE — DISCHARGE NOTE NURSING/CASE MANAGEMENT/SOCIAL WORK - PATIENT PORTAL LINK FT
You can access the FollowMyHealth Patient Portal offered by [facility] by registering at the following website: [email or web address]. By joining [employer]’s FollowMyHealth portal, you will also be able to view your health information using other applications (apps) compatible with our system.

## 2021-03-05 NOTE — PROGRESS NOTE ADULT - PROBLEM SELECTOR PLAN 1
conservative management per neurosurgery. no plan for OR  awaiting neck brace to be fitted
conservative management per neurosurgery. no plan for OR at this time.   awaiting neck brace to be fitted again

## 2021-03-06 VITALS
DIASTOLIC BLOOD PRESSURE: 58 MMHG | OXYGEN SATURATION: 97 % | HEART RATE: 80 BPM | RESPIRATION RATE: 18 BRPM | SYSTOLIC BLOOD PRESSURE: 122 MMHG | TEMPERATURE: 98 F

## 2021-03-06 PROCEDURE — 97162 PT EVAL MOD COMPLEX 30 MIN: CPT

## 2021-03-06 PROCEDURE — 76705 ECHO EXAM OF ABDOMEN: CPT

## 2021-03-06 PROCEDURE — 73562 X-RAY EXAM OF KNEE 3: CPT

## 2021-03-06 PROCEDURE — 72141 MRI NECK SPINE W/O DYE: CPT

## 2021-03-06 PROCEDURE — 93970 EXTREMITY STUDY: CPT

## 2021-03-06 PROCEDURE — 85018 HEMOGLOBIN: CPT

## 2021-03-06 PROCEDURE — 86850 RBC ANTIBODY SCREEN: CPT

## 2021-03-06 PROCEDURE — U0003: CPT

## 2021-03-06 PROCEDURE — 85014 HEMATOCRIT: CPT

## 2021-03-06 PROCEDURE — 72125 CT NECK SPINE W/O DYE: CPT

## 2021-03-06 PROCEDURE — 86769 SARS-COV-2 COVID-19 ANTIBODY: CPT

## 2021-03-06 PROCEDURE — 82435 ASSAY OF BLOOD CHLORIDE: CPT

## 2021-03-06 PROCEDURE — 86900 BLOOD TYPING SEROLOGIC ABO: CPT

## 2021-03-06 PROCEDURE — 85610 PROTHROMBIN TIME: CPT

## 2021-03-06 PROCEDURE — 82947 ASSAY GLUCOSE BLOOD QUANT: CPT

## 2021-03-06 PROCEDURE — U0005: CPT

## 2021-03-06 PROCEDURE — 87086 URINE CULTURE/COLONY COUNT: CPT

## 2021-03-06 PROCEDURE — 85730 THROMBOPLASTIN TIME PARTIAL: CPT

## 2021-03-06 PROCEDURE — 97530 THERAPEUTIC ACTIVITIES: CPT

## 2021-03-06 PROCEDURE — 87635 SARS-COV-2 COVID-19 AMP PRB: CPT

## 2021-03-06 PROCEDURE — 85027 COMPLETE CBC AUTOMATED: CPT

## 2021-03-06 PROCEDURE — 76377 3D RENDER W/INTRP POSTPROCES: CPT

## 2021-03-06 PROCEDURE — 74177 CT ABD & PELVIS W/CONTRAST: CPT

## 2021-03-06 PROCEDURE — 86901 BLOOD TYPING SEROLOGIC RH(D): CPT

## 2021-03-06 PROCEDURE — 82803 BLOOD GASES ANY COMBINATION: CPT

## 2021-03-06 PROCEDURE — 80053 COMPREHEN METABOLIC PANEL: CPT

## 2021-03-06 PROCEDURE — 99285 EMERGENCY DEPT VISIT HI MDM: CPT | Mod: 25

## 2021-03-06 PROCEDURE — 84132 ASSAY OF SERUM POTASSIUM: CPT

## 2021-03-06 PROCEDURE — 82330 ASSAY OF CALCIUM: CPT

## 2021-03-06 PROCEDURE — 83605 ASSAY OF LACTIC ACID: CPT

## 2021-03-06 PROCEDURE — 97116 GAIT TRAINING THERAPY: CPT

## 2021-03-06 PROCEDURE — 73030 X-RAY EXAM OF SHOULDER: CPT

## 2021-03-06 PROCEDURE — 85025 COMPLETE CBC W/AUTO DIFF WBC: CPT

## 2021-03-06 PROCEDURE — 70450 CT HEAD/BRAIN W/O DYE: CPT

## 2021-03-06 PROCEDURE — 81001 URINALYSIS AUTO W/SCOPE: CPT

## 2021-03-06 PROCEDURE — 84295 ASSAY OF SERUM SODIUM: CPT

## 2021-03-06 PROCEDURE — 71260 CT THORAX DX C+: CPT

## 2021-03-06 PROCEDURE — 73200 CT UPPER EXTREMITY W/O DYE: CPT

## 2021-03-06 PROCEDURE — 71045 X-RAY EXAM CHEST 1 VIEW: CPT

## 2021-03-06 PROCEDURE — 99239 HOSP IP/OBS DSCHRG MGMT >30: CPT

## 2021-03-06 RX ORDER — SENNA PLUS 8.6 MG/1
2 TABLET ORAL
Qty: 0 | Refills: 0 | DISCHARGE
Start: 2021-03-06

## 2021-03-06 RX ORDER — ACETAMINOPHEN 500 MG
2 TABLET ORAL
Qty: 0 | Refills: 0 | DISCHARGE
Start: 2021-03-06

## 2021-03-06 RX ORDER — POLYETHYLENE GLYCOL 3350 17 G/17G
17 POWDER, FOR SOLUTION ORAL
Qty: 0 | Refills: 0 | DISCHARGE
Start: 2021-03-06

## 2021-03-06 RX ADMIN — PANTOPRAZOLE SODIUM 40 MILLIGRAM(S): 20 TABLET, DELAYED RELEASE ORAL at 06:23

## 2021-03-06 RX ADMIN — Medication 650 MILLIGRAM(S): at 06:31

## 2021-03-06 RX ADMIN — AMLODIPINE BESYLATE 5 MILLIGRAM(S): 2.5 TABLET ORAL at 06:23

## 2021-03-06 RX ADMIN — POLYETHYLENE GLYCOL 3350 17 GRAM(S): 17 POWDER, FOR SOLUTION ORAL at 06:23

## 2021-03-06 RX ADMIN — Medication 650 MILLIGRAM(S): at 00:00

## 2021-03-06 NOTE — PROGRESS NOTE ADULT - SUBJECTIVE AND OBJECTIVE BOX
SUBJECTIVE: Patient seen and examined using  [facility] (ID [id number]). Complains of neck pain. + collar in place.     OVERNIGHT EVENTS: none     Vital Signs Last 24 Hrs  T(C): 36.8 (06 Mar 2021 05:21), Max: 37.7 (05 Mar 2021 21:09)  T(F): 98.2 (06 Mar 2021 05:21), Max: 99.8 (05 Mar 2021 21:09)  HR: 88 (06 Mar 2021 05:21) (78 - 104)  BP: 126/54 (06 Mar 2021 05:21) (122/54 - 140/57)  BP(mean): --  RR: 18 (06 Mar 2021 05:21) (18 - 18)  SpO2: 97% (06 Mar 2021 05:21) (94% - 98%)    PHYSICAL EXAM:    General: No Acute Distress     Neurological: Awake, alert oriented to person, place and time, Following Commands, PERRL, EOMI, Face Symmetrical, Speech Fluent, Moving all extremities, Muscle Strength normal in all four extremities, No Drift, Sensation to Light Touch Intact    Pulmonary: Clear to Auscultation, No Rales, No Rhonchi, No Wheezes     Cardiovascular: S1, S2, Regular Rate and Rhythm     Gastrointestinal: Soft, Nontender, Nondistended     LABS:                                   12.5   9.12  )-----------( 217      ( 04 Mar 2021 07:56 )             37.9     03-04 03-05    139  |  105  |  17  ----------------------------<  77  4.0   |  20<L>  |  0.64    Ca    8.7      05 Mar 2021 06:58    TPro  6.4  /  Alb  3.2<L>  /  TBili  3.0<H>  /  DBili  x   /  AST  36  /  ALT  16  /  AlkPhos  201<H>  03-05      MEDICATIONS  (STANDING):  amLODIPine   Tablet 5 milliGRAM(s) Oral daily  enoxaparin Injectable 40 milliGRAM(s) SubCutaneous <User Schedule>  pantoprazole    Tablet 40 milliGRAM(s) Oral before breakfast  polyethylene glycol 3350 17 Gram(s) Oral two times a day  sodium chloride 0.9%. 500 milliLiter(s) (50 mL/Hr) IV Continuous <Continuous>    MEDICATIONS  (PRN):  acetaminophen   Tablet .. 650 milliGRAM(s) Oral every 6 hours PRN Temp greater or equal to 38C (100.4F), Mild Pain (1 - 3)  bisacodyl 5 milliGRAM(s) Oral daily PRN Constipation  senna 2 Tablet(s) Oral at bedtime PRN Constipation      DIET: regular diet      IMAGING:     < from: CT Chest w/ IV Cont (03.04.21 @ 19:44) >  IMPRESSION:  Numerous liver lesions the largest measuring up to 6.7 cm in the right hepatic lobe, consistent with malignancy. Findings favor metastatic disease.  No evidence of metastatic disease in the chest.  No evidence of suspicious adenopathy in the chest, abdomen, or pelvis.    < end of copied text >      < from: US Abdomen Upper Quadrant Right (03.04.21 @ 14:13) >  IMPRESSION:    A 3.6 x 3.2x 4.2 cm solid mass is seen in the liver. Contrast-enhanced CT or MR is recommended for further characterization if not contraindicated.    Gallbladder is not visualized. Correlation with surgical history is recommended.    < end of copied text >      < from: MR Cervical Spine No Cont (03.03.21 @ 22:53) >    INTERPRETATION:  Clinical indication: Type II dens fracture.    MRI of the cervical spine was performed using sagittal T1 T2 T2 with sequence fat suppressionand STIR sequence. Axial T2 gradient echo and T1-weighted sequences were performed.    This exam is compared with prior CT scan cervical spine performed on March 2, 2020.    Fracture involving the dens is identified. Abnormal T2 prolongation is associated with the fracture fragments which is likely compatible areas of edema. This fracture is better demonstrated on prior CT scan.    Reversal of the normal cervical lordosis seen.    There is partial fusion seen involving the C5-6 level which is likely secondary to chronic degenerative changes.    Disc desiccation seen throughout the cervical and upper thoracic spine region    Slight anterior displacement is seen involving C7 relative to T1, T1 relative to T2, and T3 relative to C4.    Mild compression deformity seen involving T6 vertebral body. No significant retropulsed fragment is seen.    C2-3: Mild disc bulge is seen without significant, spinal canal or either neural foramen    C3-4: Disc osteophyte complex is seen. Mild narrowing of the spinal canal. Moderate to severe narrowing of the right neural foramen    C4-5: Disc osteophyte complex is seen. Bilateral hypertrophic facet joint changes seen. Mild to moderate narrowing of the spinal canal and moderate narrowing of both neural foramen.    C5-6: Disc osteophyte complex is seen. Mild narrowing of the spinal canal. Moderate to severe narrowing of the left neural foramen.    C6-7: Bilateral hypertrophic facet changes are seen. Moderate to severe narrowing of the left neural foramen    C7-T1: Normal    T1-2: Normal    The spinal cord demonstrates normal signal.    Evaluation of the paraspinal soft tissues appear normal    There is evidence of a large lesion seen involving left lobe of thyroid with small lesion seen involving both lobes as well. The possibility of a neoplastic processes cannot be entirely excluded. Clinical correlation is recommended. Dedicated imaging of the thyroid can be done for further evaluation.    IMPRESSION: Fracture involving the dens with associated edema.    Degenerative changes as described above    < end of copied text >    < from: CT 3D Reconstruct w/ Workstation (03.03.21 @ 18:02) >  Impression:  Limited evaluation. Right shoulder arthrosis consistent with known rheumatoid arthritis with moderate secondary osteoarthritis. No definite acute fracture identified.    < end of copied text >      < from: CT Head No Cont (03.02.21 @ 23:27) >  IMPRESSION:  Head CT: Small right frontal scalp hematoma. No acute intracranial hemorrhage, vasogenic edema, extra-axial calvarial fracture.    Cervical spine CT: Type II dens fracture. Cervical spondylosis as above.    < end of copied text >    < from: Xray Shoulder 2 Views, Right (03.02.21 @ 23:15) >  IMPRESSION:  Cortical step-off along the inferior medial border of the scapular body consistent with scapular fracture.    < end of copied text >    < from: Xray Chest 1 View- PORTABLE-Urgent (Xray Chest 1 View- PORTABLE-Urgent .) (03.02.21 @ 23:15) >  IMPRESSION: Clear lungs.    < end of copied text >

## 2021-03-06 NOTE — PROGRESS NOTE ADULT - PROVIDER SPECIALTY LIST ADULT
Neurosurgery
Internal Medicine
Neurosurgery
Internal Medicine

## 2021-03-06 NOTE — PROGRESS NOTE ADULT - ATTENDING COMMENTS
Pt stable.  MRI C-spine demonstrates type II odontoid fx.  Pt wearing cervical collar, orthotist to provide better fitting collar.    Case d/w granddaughter Serena [phone]. Surgical and non-surgical treatment options discussed. Given patient's age and medical comorbidities (osteopenia, hx of Covid infection, RA, HTN), I recommend non-surgical mgmt with cervical collar. RBA discussed. Surgical risks include, but are not limited to: hardware failure, failure of fusion, dsyphagia (possibly requiring PEG), dysphonia. Granddaughter has spoken with family and they are in agreement with non-surgical mgmt.    DC planning.
Pt currently in MRI.    Will need to speak with patient and family regarding treatment.
Pt stable neurologically.  In cervical collar.    CT A/P demonstrates multiple liver lesions consistent with malignancy. Family has opted for conservative mgmt.    DC planning.
Pt stable neurologically, now w/ better fitting hard cervical collar.    Conservative mgmt for odointoid fx and liver lesions. Pt to F/U w/ [facility] after discharge.
patient's hospital course and above findings discussed with the patient's PMD(Dr. Santos). no medical contraindication for d/c planning at this time. call with questions.

## 2021-03-06 NOTE — PROGRESS NOTE ADULT - ASSESSMENT
89F PMHx RA, COVID (resolved), presents to ED after unwitnessed fall at home, denies LOC. Normally independent at home. CT head grossly wnl, CT C-spine shows type 2B dens Fx. (03 Mar 2021 02:12)    PLAN:  -Neuro: Collar at all times.   -MRI cervical spine done and reviewed by Dr. Bueno. As per his discussion with patient and family wishes no plans for OR at this time.   -Hospitalist following. Transaminitis.  Plan: 1)  RUQ with + solid liver mass. CT c/a/p with numerous liver lesions consistent with malignancy  per discussion with patient's granddaughter(Serena), family is leaning towards conservative management at this time given advanced age.  contact information for [facility] [phone] provided to the family if they wish to discuss further with the oncologist.  -Orthopedics saw for right scapula fracture seen on xray. Recommend 1) No scapula fracture seen on CT 2) Pain control as needed  3) WBAT RUE, sling for comfort 4) No orthopedic surgical intervention needed at this time  -Tylenol for pain control.  -Encouraged incentive spirometry  -Continue norvasc for hypertension  -Regular diet  -Bowel regimen  -DVT ppx: venodynes and sql  -PT; home with home PT; dc home today   -Spoke at length with grand daughter Serena and updated her of discharge plan/latest results. She notes she will follow up with oncologist/hepatologist as outpatient. Contact number provider to family and on discharge paperwork. All questions answered     Above discussed with Dr. Bueno  Spectra #[phone]
89F PMHx RA, COVID (resolved), presents to ED after unwitnessed fall at home, denies LOC. Normally independent at home. CT head grossly wnl, CT C-spine shows type 2B dens Fx. (03 Mar 2021 02:12)    PLAN:  -Neuro: Collar at all times. Luis valentin called for better fitting collar   -MRI cervical spine done and reviewed by Dr. Bueno. As per his discussion with patient and family wishes no plans for OR at this time.   -Hospitalist following. Transaminitis.  Plan: 1)  RUQ sono showed solid 4.2 cm mass. CT C/A/P pending   3) no GI symptoms at this time. 4) repeat cmp in am  -Orthopedics saw for right scapula fracture seen on xray. Recommend 1) No scapula fracture seen on CT 2) Pain control as needed  3) WBAT RUE, sling for comfort 4) No orthopedic surgical intervention needed at this time  -Tylenol for pain control.  -Encouraged incentive spirometry  -Continue norvasc for hypertension  -Regular diet  -Bowel regimen  -DVT ppx: venodynes and sql  -PT; home with home PT when stable   -Spoke at length with grand daughter Serena and updated her of plan/latest results. All questions answered     Above discussed with Dr. Bueno  Spectra #[id number]
89F PMHx RA, COVID (resolved), presents to ED after unwitnessed fall at home, denies LOC. Normally independent at home. CT head grossly wnl, CT C-spine shows type 2B dens Fx. (03 Mar 2021 02:12)    PLAN:  -Neuro: Collar at all times  -MRI cervical spine pending  -OR plans as per Dr. Bueno  -Hospitalist to see for medical clearance  -Orthopedics consult placed for right scapula fracture. Follow up appreciated.  -Tylenol for pain control.  -Encouraged incentive spirometry  -Continue norvasc for hypertension  -Regular diet  -Bowel regimen  -DVT ppx: venodynes and sql  -PT; pending    Will discuss above with Dr. Bueno  Spectra #[phone]
89F PMHx RA, COVID (resolved), presents to ED after unwitnessed fall at home, denies LOC. Normally independent at home. CT head grossly wnl, CT C-spine shows type 2B dens Fx. (03 Mar 2021 02:12)    PLAN:  -Neuro: Collar at all times. Luis valentin called for better fitting collar   -MRI cervical spine done and reviewed by Dr. Bueno. As per his discussion with patient and family no plans for OR at this time.   -Hospitalist following. Transaminitis.  Plan: 1) elevated bilirubin and alk phos 2) check RUQ sono(ordered)  3) no GI symptoms at this time. 4) repeat cmp in am  -Orthopedics saw for right scapula fracture seen on xray. Recommend 1) No scapula fracture seen on CT 2) Pain control as needed  3) WBAT RUE, sling for comfort 4) No orthopedic surgical intervention needed at this time  -Tylenol for pain control.  -Encouraged incentive spirometry  -Continue norvasc for hypertension  -Regular diet  -Bowel regimen  -DVT ppx: venodynes and sql  -PT; home with home PT when stable     Above discussed with Dr. Bueno  Spectra #[phone]
89F PMHx RA(not on chronic med), COVID in 3/2020 (no hospitalization required), HTN, ? HLD presented to ED after an unwitnessed fall at home, found to have type 2B dens fracture.    granddaughter (Serena Daniel [phone])
89F PMHx RA(not on chronic med), COVID in 3/2020 (no hospitalization required), HTN, ? HLD presented to ED after an unwitnessed fall at home, found to have type 2B dens fracture and numerous liver lesions concerning for malignancy.    granddaughter (Serena Daniel [phone])

## 2021-03-29 DIAGNOSIS — Z71.89 OTHER SPECIFIED COUNSELING: ICD-10-CM

## 2024-09-05 NOTE — PROGRESS NOTE ADULT - PROBLEM SELECTOR PLAN 5
no definitive evidence of fracture on CT  WBAT with sling for comfort per ortho
no definitive evidence of fracture on CT  WBAT with sling for comfort per ortho
15-Aug-2024